# Patient Record
Sex: MALE | Race: WHITE | Employment: OTHER | URBAN - METROPOLITAN AREA
[De-identification: names, ages, dates, MRNs, and addresses within clinical notes are randomized per-mention and may not be internally consistent; named-entity substitution may affect disease eponyms.]

---

## 2023-03-07 ENCOUNTER — APPOINTMENT (OUTPATIENT)
Dept: GENERAL RADIOLOGY | Age: 68
DRG: 180 | End: 2023-03-07
Payer: MEDICARE

## 2023-03-07 ENCOUNTER — HOSPITAL ENCOUNTER (INPATIENT)
Age: 68
LOS: 2 days | Discharge: HOME | DRG: 180 | End: 2023-03-10
Attending: EMERGENCY MEDICINE | Admitting: INTERNAL MEDICINE
Payer: MEDICARE

## 2023-03-07 ENCOUNTER — APPOINTMENT (OUTPATIENT)
Dept: CT IMAGING | Age: 68
DRG: 180 | End: 2023-03-07
Payer: MEDICARE

## 2023-03-07 DIAGNOSIS — I71.43 INFRARENAL ABDOMINAL AORTIC ANEURYSM (AAA) WITHOUT RUPTURE: ICD-10-CM

## 2023-03-07 DIAGNOSIS — E87.8 ELECTROLYTE DISTURBANCE: ICD-10-CM

## 2023-03-07 DIAGNOSIS — E27.49 ADRENAL HEMORRHAGE (HCC): ICD-10-CM

## 2023-03-07 DIAGNOSIS — R91.8 MASS OF RIGHT LUNG: ICD-10-CM

## 2023-03-07 DIAGNOSIS — C80.0 WIDESPREAD METASTATIC MALIGNANT NEOPLASTIC DISEASE (HCC): Primary | ICD-10-CM

## 2023-03-07 LAB
A/G RATIO: 1.3 (ref 1.1–2.2)
ALBUMIN SERPL-MCNC: 4 G/DL (ref 3.4–5)
ALP BLD-CCNC: 117 U/L (ref 40–129)
ALT SERPL-CCNC: 12 U/L (ref 10–40)
ANION GAP SERPL CALCULATED.3IONS-SCNC: 14 MMOL/L (ref 3–16)
AST SERPL-CCNC: 24 U/L (ref 15–37)
BILIRUB SERPL-MCNC: 1.8 MG/DL (ref 0–1)
BUN BLDV-MCNC: 22 MG/DL (ref 7–20)
CALCIUM SERPL-MCNC: 9.4 MG/DL (ref 8.3–10.6)
CHLORIDE BLD-SCNC: 95 MMOL/L (ref 99–110)
CO2: 21 MMOL/L (ref 21–32)
CREAT SERPL-MCNC: 1 MG/DL (ref 0.8–1.3)
GFR SERPL CREATININE-BSD FRML MDRD: >60 ML/MIN/{1.73_M2}
GLUCOSE BLD-MCNC: 107 MG/DL (ref 70–99)
LACTIC ACID, SEPSIS: 1.7 MMOL/L (ref 0.4–1.9)
LIPASE: 10 U/L (ref 13–60)
POTASSIUM REFLEX MAGNESIUM: 4.3 MMOL/L (ref 3.5–5.1)
SODIUM BLD-SCNC: 130 MMOL/L (ref 136–145)
TOTAL PROTEIN: 7 G/DL (ref 6.4–8.2)

## 2023-03-07 PROCEDURE — 6360000004 HC RX CONTRAST MEDICATION: Performed by: EMERGENCY MEDICINE

## 2023-03-07 PROCEDURE — 96361 HYDRATE IV INFUSION ADD-ON: CPT

## 2023-03-07 PROCEDURE — 85025 COMPLETE CBC W/AUTO DIFF WBC: CPT

## 2023-03-07 PROCEDURE — 83605 ASSAY OF LACTIC ACID: CPT

## 2023-03-07 PROCEDURE — 71045 X-RAY EXAM CHEST 1 VIEW: CPT

## 2023-03-07 PROCEDURE — 80053 COMPREHEN METABOLIC PANEL: CPT

## 2023-03-07 PROCEDURE — 96375 TX/PRO/DX INJ NEW DRUG ADDON: CPT

## 2023-03-07 PROCEDURE — 99285 EMERGENCY DEPT VISIT HI MDM: CPT

## 2023-03-07 PROCEDURE — 6360000002 HC RX W HCPCS: Performed by: EMERGENCY MEDICINE

## 2023-03-07 PROCEDURE — 83930 ASSAY OF BLOOD OSMOLALITY: CPT

## 2023-03-07 PROCEDURE — 2580000003 HC RX 258: Performed by: EMERGENCY MEDICINE

## 2023-03-07 PROCEDURE — 74177 CT ABD & PELVIS W/CONTRAST: CPT

## 2023-03-07 PROCEDURE — 96374 THER/PROPH/DIAG INJ IV PUSH: CPT

## 2023-03-07 PROCEDURE — 83690 ASSAY OF LIPASE: CPT

## 2023-03-07 RX ORDER — MORPHINE SULFATE 4 MG/ML
4 INJECTION, SOLUTION INTRAMUSCULAR; INTRAVENOUS ONCE
Status: COMPLETED | OUTPATIENT
Start: 2023-03-07 | End: 2023-03-07

## 2023-03-07 RX ORDER — 0.9 % SODIUM CHLORIDE 0.9 %
1000 INTRAVENOUS SOLUTION INTRAVENOUS ONCE
Status: COMPLETED | OUTPATIENT
Start: 2023-03-07 | End: 2023-03-08

## 2023-03-07 RX ADMIN — IOPAMIDOL 75 ML: 755 INJECTION, SOLUTION INTRAVENOUS at 23:51

## 2023-03-07 RX ADMIN — MORPHINE SULFATE 4 MG: 4 INJECTION, SOLUTION INTRAMUSCULAR; INTRAVENOUS at 23:19

## 2023-03-07 RX ADMIN — SODIUM CHLORIDE 1000 ML: 9 INJECTION, SOLUTION INTRAVENOUS at 23:18

## 2023-03-07 ASSESSMENT — PAIN - FUNCTIONAL ASSESSMENT: PAIN_FUNCTIONAL_ASSESSMENT: 0-10

## 2023-03-07 ASSESSMENT — PAIN SCALES - GENERAL: PAINLEVEL_OUTOF10: 8

## 2023-03-07 ASSESSMENT — PAIN DESCRIPTION - LOCATION: LOCATION: ABDOMEN

## 2023-03-07 ASSESSMENT — PAIN DESCRIPTION - DESCRIPTORS: DESCRIPTORS: SHARP

## 2023-03-07 ASSESSMENT — PAIN DESCRIPTION - ORIENTATION: ORIENTATION: RIGHT;UPPER

## 2023-03-08 ENCOUNTER — APPOINTMENT (OUTPATIENT)
Dept: CT IMAGING | Age: 68
DRG: 180 | End: 2023-03-08
Payer: MEDICARE

## 2023-03-08 ENCOUNTER — ANESTHESIA EVENT (OUTPATIENT)
Dept: ENDOSCOPY | Age: 68
End: 2023-03-08
Payer: MEDICARE

## 2023-03-08 PROBLEM — R59.0 MEDIASTINAL ADENOPATHY: Status: ACTIVE | Noted: 2023-03-08

## 2023-03-08 PROBLEM — K66.1 NONTRAUMATIC RETROPERITONEAL HEMATOMA: Status: ACTIVE | Noted: 2023-03-08

## 2023-03-08 PROBLEM — C80.0 WIDESPREAD METASTATIC MALIGNANT NEOPLASTIC DISEASE (HCC): Status: ACTIVE | Noted: 2023-03-08

## 2023-03-08 PROBLEM — E27.8 RIGHT ADRENAL MASS (HCC): Status: ACTIVE | Noted: 2023-03-08

## 2023-03-08 PROBLEM — F17.200 CURRENT SMOKER: Status: ACTIVE | Noted: 2023-03-08

## 2023-03-08 PROBLEM — I71.43 INFRARENAL ABDOMINAL AORTIC ANEURYSM (AAA) WITHOUT RUPTURE (HCC): Status: ACTIVE | Noted: 2023-03-08

## 2023-03-08 PROBLEM — J43.2 CENTRILOBULAR EMPHYSEMA (HCC): Status: ACTIVE | Noted: 2023-03-08

## 2023-03-08 PROBLEM — R91.8 MASS OF MIDDLE LOBE OF RIGHT LUNG: Status: ACTIVE | Noted: 2023-03-08

## 2023-03-08 PROBLEM — E87.1 HYPONATREMIA: Status: ACTIVE | Noted: 2023-03-08

## 2023-03-08 PROBLEM — D72.829 LEUKOCYTOSIS: Status: ACTIVE | Noted: 2023-03-08

## 2023-03-08 PROBLEM — E27.49 ADRENAL HEMORRHAGE (HCC): Status: ACTIVE | Noted: 2023-03-08

## 2023-03-08 LAB
A/G RATIO: 1.4 (ref 1.1–2.2)
ABO/RH: NORMAL
ALBUMIN SERPL-MCNC: 3.6 G/DL (ref 3.4–5)
ALP BLD-CCNC: 96 U/L (ref 40–129)
ALT SERPL-CCNC: 11 U/L (ref 10–40)
ANION GAP SERPL CALCULATED.3IONS-SCNC: 11 MMOL/L (ref 3–16)
ANTIBODY SCREEN: NORMAL
APTT: 28.7 SEC (ref 23–34.3)
AST SERPL-CCNC: 21 U/L (ref 15–37)
ATYPICAL LYMPHOCYTE RELATIVE PERCENT: 2 % (ref 0–6)
BACTERIA: ABNORMAL /HPF
BANDED NEUTROPHILS RELATIVE PERCENT: 7 % (ref 0–7)
BASOPHILS ABSOLUTE: 0 K/UL (ref 0–0.2)
BASOPHILS ABSOLUTE: 0.2 K/UL (ref 0–0.2)
BASOPHILS RELATIVE PERCENT: 0 %
BASOPHILS RELATIVE PERCENT: 1.1 %
BILIRUB SERPL-MCNC: 1.5 MG/DL (ref 0–1)
BILIRUBIN URINE: NEGATIVE
BLOOD, URINE: ABNORMAL
BUN BLDV-MCNC: 24 MG/DL (ref 7–20)
CALCIUM SERPL-MCNC: 8.7 MG/DL (ref 8.3–10.6)
CHLORIDE BLD-SCNC: 94 MMOL/L (ref 99–110)
CLARITY: CLEAR
CO2: 23 MMOL/L (ref 21–32)
COLOR: YELLOW
CREAT SERPL-MCNC: 0.9 MG/DL (ref 0.8–1.3)
EKG ATRIAL RATE: 87 BPM
EKG ATRIAL RATE: 94 BPM
EKG DIAGNOSIS: NORMAL
EKG DIAGNOSIS: NORMAL
EKG P AXIS: 75 DEGREES
EKG P AXIS: 78 DEGREES
EKG P-R INTERVAL: 146 MS
EKG P-R INTERVAL: 150 MS
EKG Q-T INTERVAL: 340 MS
EKG Q-T INTERVAL: 378 MS
EKG QRS DURATION: 78 MS
EKG QRS DURATION: 80 MS
EKG QTC CALCULATION (BAZETT): 425 MS
EKG QTC CALCULATION (BAZETT): 454 MS
EKG R AXIS: 19 DEGREES
EKG R AXIS: 27 DEGREES
EKG T AXIS: 27 DEGREES
EKG T AXIS: 54 DEGREES
EKG VENTRICULAR RATE: 87 BPM
EKG VENTRICULAR RATE: 94 BPM
EOSINOPHILS ABSOLUTE: 0.1 K/UL (ref 0–0.6)
EOSINOPHILS ABSOLUTE: 0.2 K/UL (ref 0–0.6)
EOSINOPHILS RELATIVE PERCENT: 1 %
EOSINOPHILS RELATIVE PERCENT: 1 %
EPITHELIAL CELLS, UA: ABNORMAL /HPF (ref 0–5)
GFR SERPL CREATININE-BSD FRML MDRD: >60 ML/MIN/{1.73_M2}
GLUCOSE BLD-MCNC: 118 MG/DL (ref 70–99)
GLUCOSE URINE: NEGATIVE MG/DL
HCT VFR BLD CALC: 37 % (ref 40.5–52.5)
HCT VFR BLD CALC: 40.8 % (ref 40.5–52.5)
HEMOGLOBIN: 12.4 G/DL (ref 13.5–17.5)
HEMOGLOBIN: 13.6 G/DL (ref 13.5–17.5)
INR BLD: 1.24 (ref 0.87–1.14)
KETONES, URINE: 15 MG/DL
LEUKOCYTE ESTERASE, URINE: NEGATIVE
LYMPHOCYTES ABSOLUTE: 0.5 K/UL (ref 1–5.1)
LYMPHOCYTES ABSOLUTE: 0.8 K/UL (ref 1–5.1)
LYMPHOCYTES RELATIVE PERCENT: 1 %
LYMPHOCYTES RELATIVE PERCENT: 5.7 %
MCH RBC QN AUTO: 30.3 PG (ref 26–34)
MCH RBC QN AUTO: 30.6 PG (ref 26–34)
MCHC RBC AUTO-ENTMCNC: 33.4 G/DL (ref 31–36)
MCHC RBC AUTO-ENTMCNC: 33.6 G/DL (ref 31–36)
MCV RBC AUTO: 90.7 FL (ref 80–100)
MCV RBC AUTO: 91.2 FL (ref 80–100)
MICROSCOPIC EXAMINATION: YES
MONOCYTES ABSOLUTE: 0.8 K/UL (ref 0–1.3)
MONOCYTES ABSOLUTE: 1.3 K/UL (ref 0–1.3)
MONOCYTES RELATIVE PERCENT: 5 %
MONOCYTES RELATIVE PERCENT: 9.2 %
NEUTROPHILS ABSOLUTE: 11.7 K/UL (ref 1.7–7.7)
NEUTROPHILS ABSOLUTE: 14.5 K/UL (ref 1.7–7.7)
NEUTROPHILS RELATIVE PERCENT: 83 %
NEUTROPHILS RELATIVE PERCENT: 84 %
NITRITE, URINE: NEGATIVE
OSMOLALITY URINE: 734 MOSM/KG (ref 390–1070)
OSMOLALITY: 285 MOSM/KG (ref 280–301)
PDW BLD-RTO: 13.3 % (ref 12.4–15.4)
PDW BLD-RTO: 13.6 % (ref 12.4–15.4)
PH UA: 5.5 (ref 5–8)
PLATELET # BLD: 192 K/UL (ref 135–450)
PLATELET # BLD: 222 K/UL (ref 135–450)
PMV BLD AUTO: 7.9 FL (ref 5–10.5)
PMV BLD AUTO: 8 FL (ref 5–10.5)
POTASSIUM REFLEX MAGNESIUM: 4.2 MMOL/L (ref 3.5–5.1)
PROCALCITONIN: 0.18 NG/ML (ref 0–0.15)
PROTEIN UA: NEGATIVE MG/DL
PROTHROMBIN TIME: 15.6 SEC (ref 11.7–14.5)
RBC # BLD: 4.05 M/UL (ref 4.2–5.9)
RBC # BLD: 4.5 M/UL (ref 4.2–5.9)
RBC # BLD: NORMAL 10*6/UL
RBC UA: ABNORMAL /HPF (ref 0–4)
SLIDE REVIEW: ABNORMAL
SODIUM BLD-SCNC: 128 MMOL/L (ref 136–145)
SODIUM URINE: 40 MMOL/L
SPECIFIC GRAVITY UA: 1.01 (ref 1–1.03)
TOTAL PROTEIN: 6.2 G/DL (ref 6.4–8.2)
URINE REFLEX TO CULTURE: ABNORMAL
URINE TYPE: ABNORMAL
UROBILINOGEN, URINE: 1 E.U./DL
WBC # BLD: 14.2 K/UL (ref 4–11)
WBC # BLD: 15.9 K/UL (ref 4–11)
WBC UA: ABNORMAL /HPF (ref 0–5)

## 2023-03-08 PROCEDURE — 93005 ELECTROCARDIOGRAM TRACING: CPT | Performed by: EMERGENCY MEDICINE

## 2023-03-08 PROCEDURE — 85610 PROTHROMBIN TIME: CPT

## 2023-03-08 PROCEDURE — 6370000000 HC RX 637 (ALT 250 FOR IP): Performed by: INTERNAL MEDICINE

## 2023-03-08 PROCEDURE — 36415 COLL VENOUS BLD VENIPUNCTURE: CPT

## 2023-03-08 PROCEDURE — 2580000003 HC RX 258: Performed by: NURSE PRACTITIONER

## 2023-03-08 PROCEDURE — 99223 1ST HOSP IP/OBS HIGH 75: CPT | Performed by: STUDENT IN AN ORGANIZED HEALTH CARE EDUCATION/TRAINING PROGRAM

## 2023-03-08 PROCEDURE — 93010 ELECTROCARDIOGRAM REPORT: CPT | Performed by: INTERNAL MEDICINE

## 2023-03-08 PROCEDURE — 99222 1ST HOSP IP/OBS MODERATE 55: CPT | Performed by: SURGERY

## 2023-03-08 PROCEDURE — 81001 URINALYSIS AUTO W/SCOPE: CPT

## 2023-03-08 PROCEDURE — 2500000003 HC RX 250 WO HCPCS: Performed by: NURSE PRACTITIONER

## 2023-03-08 PROCEDURE — 85025 COMPLETE CBC W/AUTO DIFF WBC: CPT

## 2023-03-08 PROCEDURE — 6360000002 HC RX W HCPCS: Performed by: NURSE PRACTITIONER

## 2023-03-08 PROCEDURE — 86900 BLOOD TYPING SEROLOGIC ABO: CPT

## 2023-03-08 PROCEDURE — 71250 CT THORAX DX C-: CPT

## 2023-03-08 PROCEDURE — 80053 COMPREHEN METABOLIC PANEL: CPT

## 2023-03-08 PROCEDURE — 86901 BLOOD TYPING SEROLOGIC RH(D): CPT

## 2023-03-08 PROCEDURE — 1200000000 HC SEMI PRIVATE

## 2023-03-08 PROCEDURE — 86850 RBC ANTIBODY SCREEN: CPT

## 2023-03-08 PROCEDURE — 84145 PROCALCITONIN (PCT): CPT

## 2023-03-08 PROCEDURE — 85730 THROMBOPLASTIN TIME PARTIAL: CPT

## 2023-03-08 PROCEDURE — 93005 ELECTROCARDIOGRAM TRACING: CPT | Performed by: NURSE PRACTITIONER

## 2023-03-08 PROCEDURE — 84300 ASSAY OF URINE SODIUM: CPT

## 2023-03-08 PROCEDURE — 6370000000 HC RX 637 (ALT 250 FOR IP): Performed by: NURSE PRACTITIONER

## 2023-03-08 PROCEDURE — 83935 ASSAY OF URINE OSMOLALITY: CPT

## 2023-03-08 PROCEDURE — 6360000002 HC RX W HCPCS: Performed by: EMERGENCY MEDICINE

## 2023-03-08 RX ORDER — MECOBALAMIN 5000 MCG
5 TABLET,DISINTEGRATING ORAL NIGHTLY
Status: DISCONTINUED | OUTPATIENT
Start: 2023-03-08 | End: 2023-03-10 | Stop reason: HOSPADM

## 2023-03-08 RX ORDER — SODIUM CHLORIDE 9 MG/ML
INJECTION, SOLUTION INTRAVENOUS CONTINUOUS
Status: DISCONTINUED | OUTPATIENT
Start: 2023-03-08 | End: 2023-03-08

## 2023-03-08 RX ORDER — PANTOPRAZOLE SODIUM 40 MG/1
40 TABLET, DELAYED RELEASE ORAL
Status: DISCONTINUED | OUTPATIENT
Start: 2023-03-08 | End: 2023-03-10 | Stop reason: HOSPADM

## 2023-03-08 RX ORDER — ACETAMINOPHEN 650 MG/1
650 SUPPOSITORY RECTAL EVERY 6 HOURS PRN
Status: DISCONTINUED | OUTPATIENT
Start: 2023-03-08 | End: 2023-03-10 | Stop reason: HOSPADM

## 2023-03-08 RX ORDER — ACETAMINOPHEN 325 MG/1
650 TABLET ORAL EVERY 6 HOURS PRN
Status: DISCONTINUED | OUTPATIENT
Start: 2023-03-08 | End: 2023-03-10 | Stop reason: HOSPADM

## 2023-03-08 RX ORDER — SODIUM CHLORIDE 9 MG/ML
INJECTION, SOLUTION INTRAVENOUS PRN
Status: DISCONTINUED | OUTPATIENT
Start: 2023-03-08 | End: 2023-03-10 | Stop reason: HOSPADM

## 2023-03-08 RX ORDER — LABETALOL HYDROCHLORIDE 5 MG/ML
10 INJECTION, SOLUTION INTRAVENOUS EVERY 4 HOURS PRN
Status: DISCONTINUED | OUTPATIENT
Start: 2023-03-08 | End: 2023-03-10 | Stop reason: HOSPADM

## 2023-03-08 RX ORDER — SODIUM CHLORIDE 0.9 % (FLUSH) 0.9 %
5-40 SYRINGE (ML) INJECTION PRN
Status: DISCONTINUED | OUTPATIENT
Start: 2023-03-08 | End: 2023-03-10 | Stop reason: HOSPADM

## 2023-03-08 RX ORDER — MORPHINE SULFATE 2 MG/ML
2 INJECTION, SOLUTION INTRAMUSCULAR; INTRAVENOUS EVERY 4 HOURS PRN
Status: DISCONTINUED | OUTPATIENT
Start: 2023-03-08 | End: 2023-03-08

## 2023-03-08 RX ORDER — POLYETHYLENE GLYCOL 3350 17 G/17G
17 POWDER, FOR SOLUTION ORAL DAILY PRN
Status: DISCONTINUED | OUTPATIENT
Start: 2023-03-08 | End: 2023-03-10 | Stop reason: HOSPADM

## 2023-03-08 RX ORDER — ONDANSETRON 2 MG/ML
4 INJECTION INTRAMUSCULAR; INTRAVENOUS EVERY 6 HOURS PRN
Status: DISCONTINUED | OUTPATIENT
Start: 2023-03-08 | End: 2023-03-10 | Stop reason: HOSPADM

## 2023-03-08 RX ORDER — ONDANSETRON 4 MG/1
4 TABLET, ORALLY DISINTEGRATING ORAL EVERY 8 HOURS PRN
Status: DISCONTINUED | OUTPATIENT
Start: 2023-03-08 | End: 2023-03-10 | Stop reason: HOSPADM

## 2023-03-08 RX ORDER — SODIUM CHLORIDE 0.9 % (FLUSH) 0.9 %
5-40 SYRINGE (ML) INJECTION EVERY 12 HOURS SCHEDULED
Status: DISCONTINUED | OUTPATIENT
Start: 2023-03-08 | End: 2023-03-10 | Stop reason: HOSPADM

## 2023-03-08 RX ADMIN — SODIUM CHLORIDE, PRESERVATIVE FREE 10 ML: 5 INJECTION INTRAVENOUS at 21:32

## 2023-03-08 RX ADMIN — HYDROMORPHONE HYDROCHLORIDE 0.25 MG: 0.5 INJECTION, SOLUTION INTRAMUSCULAR; INTRAVENOUS; SUBCUTANEOUS at 23:32

## 2023-03-08 RX ADMIN — Medication 5 MG: at 20:01

## 2023-03-08 RX ADMIN — PANTOPRAZOLE SODIUM 40 MG: 40 TABLET, DELAYED RELEASE ORAL at 06:08

## 2023-03-08 RX ADMIN — SODIUM CHLORIDE: 9 INJECTION, SOLUTION INTRAVENOUS at 03:57

## 2023-03-08 RX ADMIN — HYDROMORPHONE HYDROCHLORIDE 0.5 MG: 1 INJECTION, SOLUTION INTRAMUSCULAR; INTRAVENOUS; SUBCUTANEOUS at 00:51

## 2023-03-08 RX ADMIN — SODIUM CHLORIDE, PRESERVATIVE FREE 10 ML: 5 INJECTION INTRAVENOUS at 07:52

## 2023-03-08 RX ADMIN — HYDROMORPHONE HYDROCHLORIDE 0.5 MG: 0.5 INJECTION, SOLUTION INTRAMUSCULAR; INTRAVENOUS; SUBCUTANEOUS at 02:41

## 2023-03-08 RX ADMIN — LABETALOL HYDROCHLORIDE 10 MG: 5 INJECTION INTRAVENOUS at 02:41

## 2023-03-08 ASSESSMENT — ENCOUNTER SYMPTOMS
VOMITING: 0
NAUSEA: 0
WHEEZING: 0
CONSTIPATION: 0
EYE PAIN: 0
STRIDOR: 0
SHORTNESS OF BREATH: 1
ABDOMINAL DISTENTION: 0
COUGH: 1
TROUBLE SWALLOWING: 0
EYE REDNESS: 0
SORE THROAT: 0
ABDOMINAL PAIN: 0
COLOR CHANGE: 0
EYE ITCHING: 0
EYE DISCHARGE: 0
BACK PAIN: 0
DIARRHEA: 0

## 2023-03-08 ASSESSMENT — PAIN SCALES - GENERAL
PAINLEVEL_OUTOF10: 0
PAINLEVEL_OUTOF10: 2
PAINLEVEL_OUTOF10: 0
PAINLEVEL_OUTOF10: 5
PAINLEVEL_OUTOF10: 0
PAINLEVEL_OUTOF10: 0
PAINLEVEL_OUTOF10: 7

## 2023-03-08 ASSESSMENT — PAIN DESCRIPTION - DESCRIPTORS: DESCRIPTORS: ACHING

## 2023-03-08 ASSESSMENT — PAIN DESCRIPTION - LOCATION: LOCATION: BACK

## 2023-03-08 NOTE — CONSULTS
80- Called A vascular surgery  Per   RE abdominal aortic anuerysm with possible hematoma  0058-  returned page

## 2023-03-08 NOTE — PROGRESS NOTES
Vascular    Asymptomatic intact AAA. ~5cm- no indication for urgent repair, generally repair when >5.5cm. 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 Full consult to follow.

## 2023-03-08 NOTE — CONSULTS
Pulmonary New Consultation       Patient Name:  Osman Mendiola ADMISSION/CC: Lung mass    PCP: No primary care provider on file. HISTORY OF PRESENT ILLNESS:   69y.o. year old male with significant past medical history of tobacco dependence that presents with right-sided chest pain. Patient said 3 days prior to presentation, he had acute shortness of breath and right-sided chest pain. He denies any fever, chills, nausea, vomiting, abdominal pain, diarrhea. He has shortness of breath with exertion, but it was acutely worse with his pain. He denies any significant weight loss, night sweats. Patient is an every day smoker. He smoked up to 1 to 2 packs/day for the past 50 years. He denies any illicit drug use, no vaping/pipes. He has had multiple jobs in the past, denies any occupational exposures. He denies any household exposures. Pulmonary was consulted for abnormal CT imaging. Past Medical History:   Diagnosis Date    Hypertension        History reviewed. No pertinent surgical history. family history is not on file.     Social History     Tobacco Use    Smoking status: Former     Types: Cigarettes    Smokeless tobacco: Never   Substance Use Topics    Alcohol use: Never        Meds:    Current Facility-Administered Medications:     sodium chloride flush 0.9 % injection 5-40 mL, 5-40 mL, IntraVENous, 2 times per day, VIKA Moreno - CNP, 10 mL at 03/08/23 0752    sodium chloride flush 0.9 % injection 5-40 mL, 5-40 mL, IntraVENous, PRN, Telly Velez, APRN - CNP    0.9 % sodium chloride infusion, , IntraVENous, PRN, Telly Velez APRN - CNP    ondansetron (ZOFRAN-ODT) disintegrating tablet 4 mg, 4 mg, Oral, Q8H PRN **OR** ondansetron (ZOFRAN) injection 4 mg, 4 mg, IntraVENous, Q6H PRN, Telly Velez APRN - CNP    polyethylene glycol (GLYCOLAX) packet 17 g, 17 g, Oral, Daily PRN, Telly Velez APRN - CNP    acetaminophen (TYLENOL) tablet 650 mg, 650 mg, Oral, Q6H PRN **OR** acetaminophen (TYLENOL) suppository 650 mg, 650 mg, Rectal, Q6H PRN, VIKA Galdamez CNP    0.9 % sodium chloride infusion, , IntraVENous, Continuous, VIKA Galdamez CNP, Last Rate: 75 mL/hr at 03/08/23 0357, New Bag at 03/08/23 0357    pantoprazole (PROTONIX) tablet 40 mg, 40 mg, Oral, QAM AC, VIKA Galdamez CNP, 40 mg at 03/08/23 3845    HYDROmorphone (DILAUDID) injection 0.25 mg, 0.25 mg, IntraVENous, Q3H PRN **OR** HYDROmorphone (DILAUDID) injection 0.5 mg, 0.5 mg, IntraVENous, Q3H PRN, VIKA Galdamez CNP, 0.5 mg at 03/08/23 0241    labetalol (NORMODYNE;TRANDATE) injection 10 mg, 10 mg, IntraVENous, Q4H PRN, VIKA Galdamez CNP, 10 mg at 03/08/23 0241     Continuous Infusions:   sodium chloride      sodium chloride 75 mL/hr at 03/08/23 0357       PRN Meds:  sodium chloride flush, sodium chloride, ondansetron **OR** ondansetron, polyethylene glycol, acetaminophen **OR** acetaminophen, HYDROmorphone **OR** HYDROmorphone, labetalol    Allergies:  Patient is allergic to codeine. REVIEW OF SYSTEMS:  Review of Systems   Constitutional:  Negative for activity change, appetite change, chills, diaphoresis and fatigue. HENT:  Negative for congestion, sore throat and trouble swallowing. Eyes:  Negative for pain, discharge, redness and itching. Respiratory:  Positive for cough and shortness of breath. Negative for wheezing and stridor. Cardiovascular:  Positive for chest pain. Negative for palpitations and leg swelling. Gastrointestinal:  Negative for abdominal distention, abdominal pain, constipation, diarrhea, nausea and vomiting. Endocrine: Negative for polydipsia, polyphagia and polyuria. Genitourinary:  Negative for difficulty urinating. Musculoskeletal:  Negative for back pain, myalgias and neck pain. Skin:  Negative for color change. Neurological:  Negative for dizziness, weakness and light-headedness.    Psychiatric/Behavioral:  Negative for agitation and behavioral problems. I personally reviewed the patient's medication list, medical and surgical history, and updated as needed. Objective:   EXAM:  /86   Pulse 90   Temp 98.1 °F (36.7 °C) (Oral)   Resp 20   Ht 5' 9\" (1.753 m)   Wt 187 lb 6.4 oz (85 kg)   SpO2 96%   BMI 27.67 kg/m²      Physical Exam  Constitutional:       General: He is not in acute distress. Appearance: He is obese. He is not toxic-appearing. HENT:      Head: Normocephalic and atraumatic. Nose: Nose normal.      Mouth/Throat:      Pharynx: No oropharyngeal exudate. Eyes:      General: No scleral icterus. Right eye: No discharge. Left eye: No discharge. Cardiovascular:      Rate and Rhythm: Normal rate and regular rhythm. Heart sounds: No murmur heard. No friction rub. No gallop. Pulmonary:      Effort: Pulmonary effort is normal. No respiratory distress. Breath sounds: No wheezing or rales. Abdominal:      General: Abdomen is flat. Bowel sounds are normal.      Palpations: Abdomen is soft. Musculoskeletal:         General: No swelling. Normal range of motion. Cervical back: Normal range of motion. Skin:     General: Skin is warm and dry. Neurological:      General: No focal deficit present. Mental Status: He is alert and oriented to person, place, and time.    Psychiatric:         Mood and Affect: Mood normal.          Data Reviewed:   LABS:  :   Recent Labs     03/07/23 2316 03/08/23 0450   WBC 15.9* 14.2*   HGB 13.6 12.4*   HCT 40.8 37.0*   MCV 90.7 91.2    192     BMP:   Recent Labs     03/07/23 2316 03/08/23  0450   * 128*   K 4.3 4.2   CL 95* 94*   CO2 21 23   BUN 22* 24*   CREATININE 1.0 0.9     PROFILE:   Recent Labs     03/07/23 2316 03/08/23  0450   AST 24 21   ALT 12 11   LIPASE 10.0*  --    BILITOT 1.8* 1.5*   ALKPHOS 117 96     PT/INR:   Recent Labs     03/08/23  0055   PROTIME 15.6*   INR 1.24*     APTT:  Recent Labs 03/08/23  0055   APTT 28.7     :  Recent Labs     03/08/23  0129   COLORU Yellow   PHUR 5.5   WBCUA 3-5   RBCUA 3-4   BACTERIA Rare*   CLARITYU Clear   SPECGRAV 1.010   LEUKOCYTESUR Negative   UROBILINOGEN 1.0   BILIRUBINUR Negative   BLOODU MODERATE*   GLUCOSEU Negative     No results for input(s): PHART, [id number], PO2ART in the last 72 hours. Chest x-ray 3/7/2023  Interstitial changes at the bases bilaterally. Costophrenic angles are sharp. Increase in vascular congestion. Left upper lobe opacity. CT chest without contrast 3/8/2023  Paratracheal adenopathy, mediastinal adenopathy. No pericardial or pleural effusion. Paraseptal and centrilobular emphysema. Left upper lobe nodule suspicious for cancer. Right lower lobe mass with posterior segment airway going into it. Atelectasis at the bases. Assessment/Plan   69y.o. year old male with significant past medical history of tobacco dependence that presents with right-sided chest pain.     Assessment:  Right lower lobe lung mass  Left upper lobe nodule  Tobacco dependence  Centrilobular and paraseptal emphysema  Paratracheal and mediastinal adenopathy  Atelectasis    Plan:  - NPO after midnight for EBUS and bronchoscopy with possible radial probe biopsy for tomorrow at 11AM. This is depending on Vascular clearance from AAA finding.   - Discussed risks/benefits of procedure with patient, consent will be obtained tomorrow  - Hold DVT ppx  - Encouraged continue smoking cessation    China Harvey MD    11:28 AM

## 2023-03-08 NOTE — PLAN OF CARE
Problem: Discharge Planning  Goal: Discharge to home or other facility with appropriate resources  Outcome: Progressing     Problem: Pain  Goal: Verbalizes/displays adequate comfort level or baseline comfort level  Outcome: Progressing     Problem: Safety - Adult  Goal: Free from fall injury  Outcome: Progressing     Problem: Risk for Elopement  Goal: Patient will not exit the unit/facility without proper excort  Outcome: Progressing  Flowsheets (Taken 3/8/2023 0300)  Nursing Interventions for Elopement Risk:   Collaborate with family members/caregivers to mitigate the elopement risk   Communicate/escalate to charge nurse the risk of elopement   Make sure patient has all necessary personal care items independent

## 2023-03-08 NOTE — CONSULTS
INPATIENT PULMONARY CRITICAL CARE CONSULT NOTE      Chief Complaint/Referring Provider:  Patient is being seen at the request of Dr. Lizz Wright for a consultation for lung mass with metastatic disease suspected     Presenting HPI: Patient came to the hospital because of abdominal pain    AS per admitting provider-Anuj Julien is a 77-year-old male with no known significant past medical history aside from noting he was told he has had high blood pressure in the past without any treatment who presents to [facility] emergency department with complaint of right upper quadrant pain since this past Monday. He also notes he has been having shortness of breath and coughing for quite some time but most noticeable within the last month as well. The pain is isolated to the right upper quadrant, does not radiate, describes as a sharp stabbing pain, average 8 out of 10 without any alleviating or propagating factors. His evaluation in the emergency department included laboratory studies, CT of the abdomen pelvis with IV contrast, and CT chest without contrast.  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 Given the hematoma, vascular surgery was consulted by the ED for possible bleed, vascular surgery did not feel the hematoma was secondary to AAA; however, they do would like to see the patient in morning. Additionally, CT of the chest showed multiple pulmonary nodules with a dominant mass in the right lower lobe consistent with metastatic disease with notable left scapular erosion. Pertinent laboratory studies show sodium 130, BUN 22, mildly elevated bilirubin at 1.8, and a WBC of 15.9. Patient received multiple rounds of IV pain medication in the emergency department.   Hospital team was consulted to admit this patient for infrarenal AAA and right adrenal hemorrhage with corresponding hematoma    Patient when seen earlier states that he came to the hospital because of excruciating right-sided abdominal pain which was significant with radiation to the back, patient states that he had 1 episode of vomiting which was like acid but no food particles, patient did not have any constipation or diarrhea, no fever or chills, patient has been having some cough with mucoid expectoration without any hemoptysis, patient does have some shortness of breath especially with exertion, patient does not have any significant pleuritic chest pain, patient has had minimal wheezing at times, patient on questioning further states that he did not have any significant odynophagia or dysphagia, patient did not have any significant dysuria or hematuria, no increasing leg edema  Patient states that he is a smoker till 3 days back, patient does not have any loss of appetite or loss of weight, patient does snore at nighttime, patient had no change in the ambient abdomen, 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, patient does not have any significant headaches or blurring of vision, did not have any significant dysuria or hematuria, patient does not have any increasing leg edema, patient did not have any other pertinent review of system of concern  Patient's abdominal pain seems to have subsided now     Patient Active Problem List    Diagnosis Date Noted    Infrarenal abdominal aortic aneurysm (AAA) without rupture 03/08/2023    Mass of middle lobe of right lung 03/08/2023    Right adrenal mass ([address] 03/08/2023    Nontraumatic retroperitoneal hematoma 03/08/2023    Hyponatremia 03/08/2023    Mediastinal adenopathy 03/08/2023    Current smoker 03/08/2023    Leukocytosis 03/08/2023    Centrilobular emphysema ([address] 03/08/2023       Past Medical History:   Diagnosis Date    Hypertension History reviewed. No pertinent surgical history. History reviewed. No pertinent family history. Social History     Tobacco Use    Smoking status: Former     Types: Cigarettes    Smokeless tobacco: Never   Substance Use Topics    Alcohol use: Never        Allergies   Allergen Reactions    Codeine Rash               Physical Exam:  Blood pressure (!) 145/82, pulse 93, temperature 98.1 °F (36.7 °C), temperature source Oral, resp. rate 20, height 5' 9\" (1.753 m), weight 187 lb 6.4 oz (85 kg), SpO2 95 %.'   Constitutional:  No acute distress. HENT:  Oropharynx is clear and moist. No thyromegaly. Eyes:  Conjunctivae are normal. Pupils equal, round, and reactive to light. No scleral icterus. Neck: . No tracheal deviation present. No obvious thyroid mass. Cardiovascular: Normal rate, regular rhythm, normal heart sounds. No right ventricular heave. No lower extremity edema. Pulmonary/Chest: No wheezes. No rales. Chest wall is not dull to percussion. No accessory muscle usage or stridor. Decreased breath sound intensity  Abdominal: Soft. Bowel sounds present. No distension or hernia. No tenderness. Musculoskeletal: No cyanosis. No clubbing. No obvious joint deformity. Lymphadenopathy: No cervical or supraclavicular adenopathy. Skin: Skin is warm and dry. No rash or nodules on the exposed extremities. Psychiatric: Normal mood and affect. Behavior is normal.  No anxiety. Neurologic: Alert, awake and oriented. PERRL.   Speech fluent        Results:  CBC:   Recent Labs     03/07/23 2316 03/08/23  0450   WBC 15.9* 14.2*   HGB 13.6 12.4*   HCT 40.8 37.0*   MCV 90.7 91.2    192     BMP:   Recent Labs     03/07/23 2316 03/08/23  0450   * 128*   K 4.3 4.2   CL 95* 94*   CO2 21 23   BUN 22* 24*   CREATININE 1.0 0.9     LIVER PROFILE:   Recent Labs     03/07/23 2316 03/08/23  0450   AST 24 21   ALT 12 11   LIPASE 10.0*  --    BILITOT 1.8* 1.5*   ALKPHOS 117 96     PT/INR:   Recent Labs     03/08/23  0055   PROTIME 15.6*   INR 1.24*     APTT:   Recent Labs     03/08/23  0055   APTT 28.7     UA:  Recent Labs     03/08/23  0129   COLORU Yellow   PHUR 5.5   WBCUA 3-5   RBCUA 3-4   BACTERIA Rare*   CLARITYU Clear   SPECGRAV 1.010   LEUKOCYTESUR Negative   UROBILINOGEN 1.0   BILIRUBINUR Negative   BLOODU MODERATE*   GLUCOSEU Negative       Imaging:  I have reviewed radiology images personally. CT Chest W/O Contrast   Final Result   1. Few bilateral pulmonary nodules including a dominant mass in the right   lower lobe compatible with malignancy with metastatic disease. 2. Mediastinal and right hilar lymphadenopathy. 3. Erosions of the left scapula compatible with osseous metastatic disease. Osteomyelitis could appear similar, but felt less likely given the additional   findings. 4. See same day CT abdomen regarding the upper abdominal findings. 5. Normal caliber thoracic aorta. 6. Mild emphysema. CT ABDOMEN PELVIS W IV CONTRAST Additional Contrast? None   Final Result   1. Hyperdense masses in the expected location of the right adrenal gland with   adjacent retroperitoneal hematoma. Findings are most compatible with   hemorrhagic adrenal metastatic disease. 2. Multiple foci of hepatic metastatic disease. 3. Large infrarenal abdominal aortic aneurysm measuring up to 5.1 cm. There   is low attenuation along the wall of the aorta surrounding the lumen, most   compatible with intramural hematoma or thrombus. This is of undetermined   stability without a comparison exam.  Recommend vascular consultation. Findings discussed directly with Shanthi Corcoran at approximately 12:15 a.m.   on 03/08/2023. XR CHEST PORTABLE   Preliminary Result   Mild nonspecific interstitial prominence in the right lung base could be   atelectasis or mild pneumonia. Indeterminate pulmonary nodule in the left upper lobe.       Recommend correlation with CT chest.           CT Chest W/O Contrast    Result Date: 3/8/2023  EXAMINATION: CT OF THE CHEST WITHOUT CONTRAST 3/8/2023 12:28 am TECHNIQUE: CT of the chest was performed without the administration of intravenous contrast. Multiplanar reformatted images are provided for review. Automated exposure control, iterative reconstruction, and/or weight based adjustment of the mA/kV was utilized to reduce the radiation dose to as low as reasonably achievable. COMPARISON: Same-day CT abdomen and chest x-ray HISTORY: ORDERING SYSTEM PROVIDED HISTORY: abnormal CXR TECHNOLOGIST PROVIDED HISTORY: Reason for exam:->abnormal CXR Decision Support Exception - unselect if not a suspected or confirmed emergency medical condition->Emergency Medical Condition (MA) Reason for Exam: abnormal chest xray FINDINGS: Mediastinum: The heart is not enlarged. No pericardial effusion. Normal caliber thoracic aorta measuring up to 3.5 cm at the ascending thoracic aorta. Right hilar lymphadenopathy measuring up to 1.7 cm in short axis. Mildly enlarged pretracheal lymph node measuring 1.3 cm in short axis. Subcarinal lymph node measures up to 1.3 cm. Lungs/pleura: Multiple highly concerning pulmonary nodules including a dominant spiculated mass in the right lower lobe measuring 3.0 x 2.7 by 2.5 cm. This abuts and may partially cross the major fissure. Pulmonary nodule seen on chest x-ray in the left upper lobe measures 1.3 cm and is also concerning for metastatic disease. Bronchiectasis with mild atelectasis in the right lower lobe. Right perihilar pulmonary nodule measures 2.7 x 2.6 cm. No pleural effusion or pneumothorax. Mild emphysema. Upper Abdomen: See same day CT abdomen. Right retroperitoneal hematoma with hemorrhagic masses favoring metastatic disease at the level of the right adrenal glands. Hepatic metastatic disease is better seen on same-day CT abdomen. Soft Tissues/Bones: No acute findings within the subcutaneous soft tissues.  Spondylosis of the visualized spine.  Osseous erosions of the left lateral scapula compatible with osseous metastatic disease. Other infectious process felt less likely given the additional findings. 1. Few bilateral pulmonary nodules including a dominant mass in the right lower lobe compatible with malignancy with metastatic disease. 2. Mediastinal and right hilar lymphadenopathy. 3. Erosions of the left scapula compatible with osseous metastatic disease. Osteomyelitis could appear similar, but felt less likely given the additional findings. 4. See same day CT abdomen regarding the upper abdominal findings. 5. Normal caliber thoracic aorta. 6. Mild emphysema. CT ABDOMEN PELVIS W IV CONTRAST Additional Contrast? None    Result Date: 3/8/2023  EXAMINATION: CT OF THE ABDOMEN AND PELVIS WITH CONTRAST 3/7/2023 11:45 pm TECHNIQUE: CT of the abdomen and pelvis was performed with the administration of intravenous contrast. Multiplanar reformatted images are provided for review. Automated exposure control, iterative reconstruction, and/or weight based adjustment of the mA/kV was utilized to reduce the radiation dose to as low as reasonably achievable. COMPARISON: None. HISTORY: ORDERING SYSTEM PROVIDED HISTORY: RUQ pain, positive moctezuma's sign TECHNOLOGIST PROVIDED HISTORY: Additional Contrast?->None Reason for exam:->RUQ pain, positive moctezuma's sign Decision Support Exception - unselect if not a suspected or confirmed emergency medical condition->Emergency Medical Condition (MA) Reason for Exam: right upper abdominal pain Relevant Medical/Surgical History: positive moctezuma's sign FINDINGS: Lower Chest: Bronchiectasis in the right lung base with mild right basilar infiltrate or atelectasis. Background of mild emphysema. Organs: Liver: Multiple liver masses concerning for metastatic disease. The largest measures 4.8 x 4.7 cm and 50 Hounsfield units. Gallbladder: Unremarkable gallbladder. No biliary ductal dilatation Spleen: Unremarkable spleen. Pancreas: No peripancreatic inflammatory changes. Adrenal Glands: Ill-defined right adrenal gland. Hyperdense masses in the expected location of the right adrenal gland measuring 60 Hounsfield units and up to 5.4 x 3.4 by 6.1 cm and 4.1 x 3.3 cm. Favor hemorrhagic masses such as metastatic disease. Spontaneous adrenal hemorrhage felt less likely. Unremarkable left adrenal gland. Kidneys: No hydronephrosis. Multiple small renal cysts bilaterally. Retroperitoneal hematoma likely extending from the right adrenal masses adjacent to the right kidney. Nonobstructing bilateral nephrolithiasis measuring up to 5 mm on the left. GI/Bowel: Stomach: The stomach is nondistended. Small bowel: Inflammatory changes near the distal stomach and proximal duodenum, likely related to the above process. Gastric bleeding is felt less likely. No evidence of small-bowel obstruction. Colon: No significant pericolonic inflammatory changes. Appendix: Normal appearance of the appendix. Pelvis: No free fluid in the pelvis. Prostate calcifications. Incomplete distension of the urinary bladder. Peritoneum/Retroperitoneum: Large infrarenal abdominal aortic aneurysm measuring up to 5.1 by 4.9 cm. There is an area of low attenuation, likely intramural hematoma or thrombus surrounding the the aorta. The inferior mesenteric artery projects through this area of low attenuation. Findings are of undetermined stability without a comparison study. Bones/Soft Tissues: No acute findings within the subcutaneous soft tissues. Spondylosis of the visualized spine. 1. Hyperdense masses in the expected location of the right adrenal gland with adjacent retroperitoneal hematoma. 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 2. Multiple foci of hepatic metastatic disease. 3. Large infrarenal abdominal aortic aneurysm measuring up to 5.1 cm.   There is low attenuation along the wall of the aorta surrounding the lumen, most compatible with intramural hematoma or thrombus. This is of undetermined stability without a comparison exam.  Recommend vascular consultation. Findings discussed directly with Duwaine Apley at approximately 12:15 a.m. on 03/08/2023. XR CHEST PORTABLE    Result Date: 3/8/2023  EXAMINATION: ONE XRAY VIEW OF THE CHEST 3/7/2023 11:47 pm COMPARISON: None. HISTORY: ORDERING SYSTEM PROVIDED HISTORY: tachycardia TECHNOLOGIST PROVIDED HISTORY: Reason for exam:->tachycardia Reason for Exam: Abdominal Pain (RUQ pain. Started Monday. Last BM this weekend. Emesis x1) FINDINGS: The cardiomediastinal silhouette is not enlarged. No pleural effusion or pneumothorax. Mild interstitial prominence most notable in the right lower lobe. Circumscribed nodule in the left upper lobe is not definitively calcified and is indeterminate without a comparison study. Mild nonspecific interstitial prominence in the right lung base could be atelectasis or mild pneumonia. Indeterminate pulmonary nodule in the left upper lobe. Recommend correlation with CT chest.           Echocardiogram:None in Epic   PFT:None in Epic         Assessment:  Principal Problem:    Infrarenal abdominal aortic aneurysm (AAA) without rupture  Active Problems:    Mass of middle lobe of right lung    Right adrenal mass ([facility])    Nontraumatic retroperitoneal hematoma    Hyponatremia    Mediastinal adenopathy    Current smoker    Leukocytosis    Centrilobular emphysema ([facility])  Resolved Problems:    * No resolved hospital problems.  *          Plan:   Oxygen supplementation, if required, to keep saturation between 90 to 94% only  Patient was on room air oxygen when seen  Patient was shown the CT of the chest along with his findings, differential diagnosis along with implications options  On the basis of patient's clinical history as well as data available it appears that patient has a metastatic disease probably coming from lungs  Patient does have hyponatremia which may be because of paraneoplastic syndrome  Patient also has mediastinal adenopathy at this time and there is possible skeletal metastasis  Patient also has a AAA  Patient was told about the disease process and different diagnoses including patient having metastatic disease and patient was told that we can subject the patient to have CT-guided lung biopsy for diagnosis   Patient understands his clinical status and patient states that he it is too much for him to take and patient states that he understand that he may be having metastatic disease and patient does not want any intervention including biopsies at this time-patient is competent to make decisions and patient states that he understands the consequences that  Vascular surgery has been requested to evaluate the patient  Monitor input output and BMP  Correct electrolytes and whenever necessary basis  Analgesia as per IM  Monitor for any hypoventilation and hypercarbia  PUD and DVT prophylaxis as per IM      Case discussed with patient and nursing    Patient again understands that he may have a metastatic cancer of the head but he does not want any interventions including biopsies at this time-if patient's clinical status or decision changes regarding course of care please call            Electronically signed by:  Cem Ram MD    3/8/2023    1:08 PM.

## 2023-03-08 NOTE — H&P
Hospital Medicine History & Physical      PCP: No primary care provider on file. Date of Admission: 3/8/2023    Time of Service: 0200    Date of Service: Pt seen/examined on 3/8/2023 and admitted to Inpatient with expected LOS greater than two midnights due to medical therapy. Historian: Self, ED documentation, Epic chart review, Care Everywhere    Chief Concern:    Chief Complaint   Patient presents with    Abdominal Pain     RUQ pain. Started Monday. Last BM this weekend. Emesis x1     History Of Present Illness:      Rashmi Ramirez is a 68-year-old male with no known significant past medical history aside from noting he was told he has had high blood pressure in the past without any treatment who presents to [facility] emergency department with complaint of right upper quadrant pain since this past Monday. He also notes he has been having shortness of breath and coughing for quite some time but most noticeable within the last month as well. The pain is isolated to the right upper quadrant, does not radiate, describes as a sharp stabbing pain, average 8 out of 10 without any alleviating or propagating factors. His evaluation in the emergency department included laboratory studies, CT of the abdomen pelvis with IV contrast, and CT chest without contrast.  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 Given the hematoma, vascular surgery was consulted by the ED for possible bleed, vascular surgery did not feel the hematoma was secondary to AAA; however, they do would like to see the patient in morning.   Additionally, CT of the chest showed multiple pulmonary nodules with a dominant mass in the right lower lobe consistent with metastatic disease with notable left scapular erosion. Pertinent laboratory studies show sodium 130, BUN 22, mildly elevated bilirubin at 1.8, and a WBC of 15.9. Patient received multiple rounds of IV pain medication in the emergency department. Hospital team was consulted to admit this patient for infrarenal AAA and right adrenal hemorrhage with corresponding hematoma. Past Medical History:          Diagnosis Date    Hypertension        Past Surgical History:      History reviewed. No pertinent surgical history. Medications Prior to Admission:      Prior to Admission medications    Not on File       Allergies:  Codeine    Social History:      The patient currently lives at home. TOBACCO:   reports that he has quit smoking. His smoking use included cigarettes. He has never used smokeless tobacco.  ETOH:   reports no history of alcohol use. E-cigarette/Vaping       Questions Responses    E-cigarette/Vaping Use     Start Date     Passive Exposure     Quit Date     Counseling Given     Comments           Family History:      Reviewed in detail at bedside with patient; does not recall pertinent family history    History reviewed. No pertinent family history. REVIEW OF SYSTEMS COMPLETED:   Pertinent positives as noted in the HPI. All other systems reviewed and negative. PHYSICAL EXAM PERFORMED:    /78   Pulse 88   Temp 97.9 °F (36.6 °C) (Oral)   Resp 20   Ht 5' 9\" (1.753 m)   Wt 187 lb 6.4 oz (85 kg)   SpO2 96%   BMI 27.67 kg/m²     General appearance:  No apparent distress, appears stated age and cooperative. HEENT:  Normal cephalic, atraumatic without obvious deformity. Pupils equal, round, and reactive to light. Extra ocular muscles intact. Conjunctivae/corneas clear. Neck: Supple, with full range of motion. No jugular venous distention. Trachea midline. Respiratory:  Normal respiratory effort.   Bilateral breath sounds auscultated, crackles noted posteriorly in both lungs  Cardiovascular:  Regular rate and rhythm with normal S1/S2 without murmurs, rubs or gallops. Abdomen: Soft, tenderness noted to the right upper quadrant and epigastric area, guarding, no rebound tenderness, right CVA tenderness as well, non-distended with normal bowel sounds. Musculoskeletal:  No clubbing, cyanosis or edema bilaterally. Full range of motion without deformity. Skin: Skin color, texture, turgor normal.  No rashes or lesions. Neurologic:  Neurovascularly intact without any focal sensory/motor deficits. Cranial nerves: II-XII intact, grossly non-focal.  Psychiatric:  Alert and oriented, thought content appropriate, normal insight  Capillary Refill: Brisk,3 seconds, normal  Peripheral Pulses: +2 palpable, equal bilaterally     Labs:     Recent Labs     03/07/23  2316   WBC 15.9*   HGB 13.6   HCT 40.8        Recent Labs     03/07/23  2316   *   K 4.3   CL 95*   CO2 21   BUN 22*   CREATININE 1.0   CALCIUM 9.4     Recent Labs     03/07/23  2316   AST 24   ALT 12   BILITOT 1.8*   ALKPHOS 117     Recent Labs     03/08/23  0055   INR 1.24*       Urinalysis:      Lab Results   Component Value Date/Time    NITRU Negative 03/08/2023 01:29 AM    WBCUA 3-5 03/08/2023 01:29 AM    BACTERIA Rare 03/08/2023 01:29 AM    RBCUA 3-4 03/08/2023 01:29 AM    BLOODU MODERATE 03/08/2023 01:29 AM    SPECGRAV 1.010 03/08/2023 01:29 AM    GLUCOSEU Negative 03/08/2023 01:29 AM     Radiology:     I have reviewed the radiographic results for this encounter with the following interpretation(s); see report(s) below:    CT Chest W/O Contrast   Final Result   1. Few bilateral pulmonary nodules including a dominant mass in the right   lower lobe compatible with malignancy with metastatic disease. 2. Mediastinal and right hilar lymphadenopathy. 3. Erosions of the left scapula compatible with osseous metastatic disease. Osteomyelitis could appear similar, but felt less likely given the additional   findings.    4. See same day CT abdomen regarding the upper abdominal findings. 5. Normal caliber thoracic aorta. 6. Mild emphysema. CT ABDOMEN PELVIS W IV CONTRAST Additional Contrast? None   Final Result   1. Hyperdense masses in the expected location of the right adrenal gland with   adjacent retroperitoneal hematoma. Findings are most compatible with   hemorrhagic adrenal metastatic disease. 2. Multiple foci of hepatic metastatic disease. 3. Large infrarenal abdominal aortic aneurysm measuring up to 5.1 cm. There   is low attenuation along the wall of the aorta surrounding the lumen, most   compatible with intramural hematoma or thrombus. This is of undetermined   stability without a comparison exam.  Recommend vascular consultation. Findings discussed directly with Hoa Zaidi at approximately 12:15 a.m.   on 03/08/2023. XR CHEST PORTABLE   Preliminary Result   Mild nonspecific interstitial prominence in the right lung base could be   atelectasis or pneumonia. Indeterminate pulmonary nodule in the left upper lobe.       Recommend correlation with CT chest.             EKG:  I have reviewed the EKG with the following interpretation in the absence of a cardiologist: Pending    Consults:    IP CONSULT TO VASCULAR SURGERY  IP CONSULT TO HOSPITALIST  IP CONSULT TO SPIRITUAL SERVICES    ASSESSMENT:    Active Hospital Problems    Diagnosis Date Noted    Infrarenal abdominal aortic aneurysm (AAA) without rupture [I71.43] 03/08/2023     Priority: High    Mass of middle lobe of right lung [R91.8] 03/08/2023     Priority: Medium    Right adrenal mass ([address] [E27.8] 03/08/2023     Priority: Medium    Nontraumatic retroperitoneal hematoma [K66.1] 03/08/2023     Priority: Medium    Hyponatremia [E87.1] 03/08/2023     Priority: Medium       PLAN:    Infrarenal AAA  ~ 5.1cm, with right adrenal mass and corresponding hemorrhage/hematoma concerning for bleed, Vascular Sx was consulted by ED, thought to not be 2/2 AAA  - Vascular to eval in AM  - Continue pain control    Metastatic Disease  - No established cancer history, in fact, no established primary care  - RML mass suspicious as source point, notable masses along left scapula, liver, and right adrenal gland  - Patient not amenable to discussing evaluation and treatment options at this time  - Spiritual Services consulted to assist    COPD  - Presumably, crackles with dyspnea  - DuoNebs ordered  - ~45 p-y smoking history, last cigarette two days ago    Hypertension  - Labetalol PRN for BP goal < 160/99      DVT Prophylaxis: SCDs  SRMB Prophylaxis: Protonix  Diet: Diet NPO Exceptions are: Ice Chips, Sips of Water with Meds, Sips of Clear Liquids  Code Status: Full Code    PT/OT Eval Status: N/A at this time    Dispo - 1-2 days per clinical improvement    Chava Levy, APRN - CNP    Thank you No primary care provider on file. for the opportunity to be involved in this patient's care.  If you have any questions or concerns please feel free to contact me at [phone].---Anticipated co-signer, Dr. Princess Krishna    (Please note that portions of this note were completed with a voice recognition program. Efforts were made to edit the dictations but occasionally words are mis-transcribed.)

## 2023-03-08 NOTE — CONSULTS
Thanks for consulting [facility] for the care of [facility]. Full consult will follow  Please call with questions at-  24 Hrs Answering service [phone]  Perfect serve, or cell phone 7 am - [address] [facility], [place]   [facility] neph[facility]  [facility]. [facility]

## 2023-03-08 NOTE — ACP (ADVANCE CARE PLANNING)
Advance Care Planning     Advance Care Planning Inpatient Note  Spiritual Care Department    Today's Date: 3/8/2023  Unit: [facility] C[facility] PCU    Received request from IDT Member. Upon review of chart and communication with care team, patient's decision making abilities are not in question. . Patient and 2 sisters and daughter-in-law  was/were present in the room during visit. Goals of ACP Conversation:  Discuss advance care planning documents    Health Care Decision Makers:       Primary Decision Maker: VONNIE LÓPEZ - Brother/Sister - [phone]    Secondary Decision Maker: Bautistagwendolyn Nava - Child - [phone]    Secondary Decision Maker: Deena briseno - Brother/Sister - [phone]  Summary:  Completed New Documents  Updated Healthcare Decision [facility]    Advance Care Planning Documents (Patient Wishes):  Healthcare Power of /Advance Directive Appointment of Health Care Agent  Living Will/Advance Directive     Assessment:  Pt with sisters and daughter-in-law in room. Pt ha two children, Braxton Inman and Thomas Palmer. Pt named sister Miesha Cisse as primary decision maker, Braxton Mask his son as secondary decision maker and his sister Manny Roberson as another secondary decision maker. New document completed. Interventions:  Provided education on documents for clarity and greater understanding  Assisted in the completion of documents according to patient's wishes at this time    Care Preferences Communicated:     Hospitalization:  If the patient's health worsens and it becomes clear that the chance of recovery is unlikely,     the patient prefers comfort-focused treatment without hospitalization. Ventilation:   If the patient, in their present state of health, suddenly became very ill and unable to breathe on their own,     the patient would desire the use of a ventilator (breathing machine).     If their health worsens and it becomes clear that the change of recovery is unlikely,     the patient would NOT desire the use of a ventilator (breathing machine). Resuscitation:  In the event the patient's heart stopped as a result of an underlying serious health condition, the patient communicates a preference for      resuscitative attempts (CPR). Outcomes/Plan:  New advance directive completed. Returned original document(s) to patient, as well as copies for distribution to appointed agents  Copy of advance directive given to staff to scan into medical record.   Teach Back Method used to verify the patient's and/or Healthcare Decision Maker's understanding of key information in the advance directive documents    Electronically signed by Chaplain Hi on 3/8/2023 at 10:20 AM

## 2023-03-08 NOTE — CONSULTS
Vascular Surgery Consultation    Date of Admission:  3/7/2023 10:55 PM  Date of Consultation:  3/8/2023    PCP:  No primary care provider on file. Chief Complaint: RUQ pain and SOB    History of Present Illness: We are asked to see this patient in consultation by Dr. Carmina Sanches regarding an AAA. Consuelo Joyce is a 69 y.o. male who presented to ED with above complaints. CT scans in ED showing intact 5cm AAA, hepatic and adrenal masses with evidence of probable bleed in Right adrenal, and lung mass. Denies other abdominal pain or back pain. Long smoking history and is currently a current everyday smoker. Past Medical History:  Past Medical History:   Diagnosis Date    Hypertension        Past Surgical History:  History reviewed. No pertinent surgical history. Home Medications:   Prior to Admission medications    Not on File        Facility Administered Medications:    sodium chloride flush  5-40 mL IntraVENous 2 times per day    pantoprazole  40 mg Oral QAM AC       Allergies:  Codeine     Social History:      Social History     Socioeconomic History    Marital status: Single     Spouse name: Not on file    Number of children: Not on file    Years of education: Not on file    Highest education level: Not on file   Occupational History    Not on file   Tobacco Use    Smoking status: Former     Types: Cigarettes    Smokeless tobacco: Never   Substance and Sexual Activity    Alcohol use: Never    Drug use: Never    Sexual activity: Not on file   Other Topics Concern    Not on file   Social History Narrative    Not on file     Social Determinants of Health     Financial Resource Strain: Not on file   Food Insecurity: Not on file   Transportation Needs: Not on file   Physical Activity: Not on file   Stress: Not on file   Social Connections: Not on file   Intimate Partner Violence: Not on file   Housing Stability: Not on file       Family History:    History reviewed.  No pertinent family history. Review of Systems:  A 14 point review of systems was completed. Pertinent positives identified in the HPI, all other review of systems negative. Physical Examination:    /78   Pulse 88   Temp 97.9 °F (36.6 °C) (Oral)   Resp 20   Ht 5' 9\" (1.753 m)   Wt 187 lb 6.4 oz (85 kg)   SpO2 96%   BMI 27.67 kg/m²        Admission Weight: 170 lb (77.1 kg)       General appearance: NAD  Eyes: PERRLA  Neck: no JVD, no lymphadenopathy. Respiratory: effort is unlabored, no crackles, wheezes or rubs. Cardiovascular: regular, no murmur. No carotid bruits. No edema or varicosities. Pulses:    femoral PT   RIGHT 2 2   LEFT 2 2   GI: abdomen soft, nondistended, no organomegaly. Aorta not palpable. Musculoskeletal: strength and tone normal.  Extremities: warm and pink. Skin: no dermatitis or ulceration. Neuro/psychiatric: grossly intact. MEDICAL DECISION MAKING/TESTING        CT: images personally reviewed and interpreted. 5.1cm AAA with no evidence of leak or rupture. Hemorrhagic Left adrenal mass and intra hepatic mass. Labs:   CBC:   Recent Labs     03/07/23 2316 03/08/23  0450   WBC 15.9* 14.2*   HGB 13.6 12.4*   HCT 40.8 37.0*   MCV 90.7 91.2    192     BMP:   Recent Labs     03/07/23 2316 03/08/23  0450   * 128*   K 4.3 4.2   CL 95* 94*   CO2 21 23   BUN 22* 24*   CREATININE 1.0 0.9   CALCIUM 9.4 8.7     Cardiac Enzymes: No results for input(s): CKTOTAL, CKMB, CKMBINDEX, TROPONINI in the last 72 hours.   PT/INR:   Recent Labs     03/08/23  0055   PROTIME 15.6*   INR 1.24*     APTT:   Recent Labs     03/08/23 0055   APTT 28.7     Liver Profile:  Lab Results   Component Value Date/Time    AST 21 03/08/2023 04:50 AM    ALT 11 03/08/2023 04:50 AM    BILITOT 1.5 03/08/2023 04:50 AM    ALKPHOS 96 03/08/2023 04:50 AM   No results found for: CHOL, HDL, TRIG  TSH:  No results found for: TSH  UA:   Lab Results   Component Value Date/Time    COLORU Yellow 03/08/2023 01:29 AM PHUR 5.5 03/08/2023 01:29 AM    WBCUA 3-5 03/08/2023 01:29 AM    RBCUA 3-4 03/08/2023 01:29 AM    BACTERIA Rare 03/08/2023 01:29 AM    CLARITYU Clear 03/08/2023 01:29 AM    SPECGRAV 1.010 03/08/2023 01:29 AM    LEUKOCYTESUR Negative 03/08/2023 01:29 AM    UROBILINOGEN 1.0 03/08/2023 01:29 AM    BILIRUBINUR Negative 03/08/2023 01:29 AM    BLOODU MODERATE 03/08/2023 01:29 AM    GLUCOSEU Negative 03/08/2023 01:29 AM       Assessment:  RUQ pain likely secondary to adrenal mass with hemorrhage. Probable metastatic lung CA  Asymptomatic AAA. Recommendations: Proceed with diagnostic studies regarding probable diagnosis of lung CA. No plans for AAA repair at present. Would consider repair when >5.5cm depending on life expectancy.

## 2023-03-08 NOTE — PROGRESS NOTES
/86   Pulse 90   Temp 98.1 °F (36.7 °C) (Oral)   Resp 20   Ht 5' 9\" (1.753 m)   Wt 187 lb 6.4 oz (85 kg)   SpO2 96%   BMI 27.67 kg/m²  on room air. Pt resting quietly in bed, denies pain, discomfort or shortness of breath at this time. Lungs diminished. NSR on tele. IVF's infusing at ordered rate on MAR. Pt denies any needs at this time, other than asking about eating. Writer will send message to MD regarding. Call light within reach. Pt instructed to call out for any needs and assistance. Will continue to monitor.   Maggy Juarez RN  3/8/2023

## 2023-03-08 NOTE — ED PROVIDER NOTES
[facility] C4 PCU  EMERGENCY DEPARTMENT ENCOUNTER        Pt Name: Barbara Willingham  MRN: [id number]  [place] 1955  Date of evaluation: 3/7/2023  Provider: Flakito Cueto MD  PCP: No primary care provider on file. Note Started: 11:03 PM EST 3/7/23    CHIEF COMPLAINT       Chief Complaint   Patient presents with    Abdominal Pain     RUQ pain. Started Monday. Last BM this weekend. Emesis x1       HISTORY OF PRESENT ILLNESS: 1 or more Elements             Barbara Willingham is a 69 y.o. male who presents with RUQ pain for 3 days which is worsening. No radiation. He had 1 episode of vomiting with the first attack. Was initially intermittent but now more constant. Poor appetite. Pain did not worsen when he did manage to eat. No fevers but during the first episode he broke out in a sweat. Last BM was Saturday. He has not had blood in his stools. No other pain sxs. When the pain is severe he feels like it takes his breath away. No meds taken for sxs. No prior episodes of this. Pain is 8/10 presently and sharp in nature. No urinary sxs. No respiratory sxs,     Nursing Notes were all reviewed and agreed with or any disagreements were addressed in the HPI. REVIEW OF SYSTEMS :      Review of Systems    Positives and Pertinent negatives as per HPI. SURGICAL HISTORY   History reviewed. No pertinent surgical history. CURRENTMEDICATIONS       There are no discharge medications for this patient. ALLERGIES     Codeine    FAMILYHISTORY     History reviewed. No pertinent family history.      SOCIAL HISTORY       Social History     Tobacco Use    Smoking status: Former     Types: Cigarettes    Smokeless tobacco: Never   Substance Use Topics    Alcohol use: Never    Drug use: Never       SCREENINGS        Alyssa Coma Scale  Eye Opening: Spontaneous  Best Verbal Response: Oriented  Best Motor Response: Obeys commands  Alyssa Coma Scale Score: 15                CIWA Assessment  BP: 123/78  Heart Rate: 88           PHYSICAL EXAM  1 or more Elements     ED Triage Vitals [03/07/23 2256]   BP Temp Temp Source Heart Rate Resp SpO2 Height Weight   (!) 178/91 98.6 °F (37 °C) Oral (!) 104 20 97 % 5' 9\" (1.753 m) 170 lb (77.1 kg)       Physical Exam  Vitals and nursing note reviewed. Constitutional:       Appearance: Normal appearance. He is well-developed. He is not ill-appearing. HENT:      Head: Normocephalic and atraumatic. Right Ear: External ear normal.      Left Ear: External ear normal.      Nose: Nose normal.   Eyes:      General: No scleral icterus. Right eye: No discharge. Left eye: No discharge. Conjunctiva/sclera: Conjunctivae normal.   Cardiovascular:      Rate and Rhythm: Normal rate and regular rhythm. Heart sounds: Normal heart sounds. Pulmonary:      Effort: Pulmonary effort is normal. No respiratory distress. Breath sounds: Normal breath sounds. No wheezing or rales. Abdominal:      General: Bowel sounds are normal. There is distension. Palpations: Abdomen is soft. Tenderness: There is abdominal tenderness in the right upper quadrant. There is right CVA tenderness and guarding. There is no rebound. Positive signs include Pascual's sign. Negative signs include McBurney's sign. Musculoskeletal:      Cervical back: Neck supple. Skin:     Coloration: Skin is not pale. Neurological:      Mental Status: He is alert.    Psychiatric:         Mood and Affect: Mood normal.         Behavior: Behavior normal.           DIAGNOSTIC RESULTS   LABS:    Labs Reviewed   CBC WITH AUTO DIFFERENTIAL - Abnormal; Notable for the following components:       Result Value    WBC 15.9 (*)     Neutrophils Absolute 14.5 (*)     Lymphocytes Absolute 0.5 (*)     All other components within normal limits   COMPREHENSIVE METABOLIC PANEL W/ REFLEX TO MG FOR LOW K - Abnormal; Notable for the following components:    Sodium 130 (*)     Chloride 95 (*)     Glucose 107 (*)     BUN 22 (*)     Total Bilirubin 1.8 (*)     All other components within normal limits   LIPASE - Abnormal; Notable for the following components:    Lipase 10.0 (*)     All other components within normal limits   URINALYSIS WITH REFLEX TO CULTURE - Abnormal; Notable for the following components:    Ketones, Urine 15 (*)     Blood, Urine MODERATE (*)     All other components within normal limits   PROTIME-INR - Abnormal; Notable for the following components:    Protime 15.6 (*)     INR 1.24 (*)     All other components within normal limits   MICROSCOPIC URINALYSIS - Abnormal; Notable for the following components:    Bacteria, UA Rare (*)     All other components within normal limits   CBC WITH AUTO DIFFERENTIAL - Abnormal; Notable for the following components:    WBC 14.2 (*)     RBC 4.05 (*)     Hemoglobin 12.4 (*)     Hematocrit 37.0 (*)     Neutrophils Absolute 11.7 (*)     Lymphocytes Absolute 0.8 (*)     All other components within normal limits   LACTATE, SEPSIS   APTT   LACTATE, SEPSIS   COMPREHENSIVE METABOLIC PANEL W/ REFLEX TO MG FOR LOW K   TYPE AND SCREEN       When ordered only abnormal lab results are displayed. All other labs were within normal range or not returned as of this dictation. EKG: Twelve-lead EKG as read and interpreted by myself shows normal sinus rhythm at a rate of 94 bpm, per interval QRS QTc and upper normal axis no acute ischemic findings. He does have significant artifact though. RADIOLOGY:   Non-plain film images such as CT, Ultrasound and MRI are read by the radiologist. Plain radiographic images are visualized and preliminarily interpreted by the ED Provider with the below findings:        Interpretation per the Radiologist below, if available at the time of this note:    CT Chest W/O Contrast   Final Result   1. Few bilateral pulmonary nodules including a dominant mass in the right   lower lobe compatible with malignancy with metastatic disease. 2. Mediastinal and right hilar lymphadenopathy. 3. Erosions of the left scapula compatible with osseous metastatic disease. Osteomyelitis could appear similar, but felt less likely given the additional   findings. 4. See same day CT abdomen regarding the upper abdominal findings. 5. Normal caliber thoracic aorta. 6. Mild emphysema. CT ABDOMEN PELVIS W IV CONTRAST Additional Contrast? None   Final Result   1. Hyperdense masses in the expected location of the right adrenal gland with   adjacent retroperitoneal hematoma. Findings are most compatible with   hemorrhagic adrenal metastatic disease. 2. Multiple foci of hepatic metastatic disease. 3. Large infrarenal abdominal aortic aneurysm measuring up to 5.1 cm. There   is low attenuation along the wall of the aorta surrounding the lumen, most   compatible with intramural hematoma or thrombus. This is of undetermined   stability without a comparison exam.  Recommend vascular consultation. Findings discussed directly with Carrie Guajardo at approximately 12:15 a.m.   on 03/08/2023. XR CHEST PORTABLE   Preliminary Result   Mild nonspecific interstitial prominence in the right lung base could be   atelectasis or pneumonia. Indeterminate pulmonary nodule in the left upper lobe. Recommend correlation with CT chest.           No results found. No results found. PROCEDURES   Unless otherwise noted below, none     Procedures    CRITICAL CARE TIME   Total Critical Care time was 55 minutes, excluding separately reportable procedures. There was a high probability of clinically significant/life threatening deterioration in the patient's condition which required my urgent intervention. PAST MEDICAL HISTORY      has a past medical history of Hypertension.      EMERGENCY DEPARTMENT COURSE and DIFFERENTIAL DIAGNOSIS/MDM:   Vitals:    Vitals:    03/08/23 0154 03/08/23 0224 03/08/23 0332 03/08/23 0354   BP: (!) 146/96 (!) 160/103 123/78    Pulse: 91 98 88    Resp: 19 14 20 Temp:   97.9 °F (36.6 °C)    TempSrc:   Oral    SpO2: 95% 98% 96%    Weight:    187 lb 6.4 oz (85 kg)   Height:           Patient was given the following medications:  Medications   sodium chloride flush 0.9 % injection 5-40 mL (has no administration in time range)   sodium chloride flush 0.9 % injection 5-40 mL (has no administration in time range)   0.9 % sodium chloride infusion (has no administration in time range)   ondansetron (ZOFRAN-ODT) disintegrating tablet 4 mg (has no administration in time range)     Or   ondansetron (ZOFRAN) injection 4 mg (has no administration in time range)   polyethylene glycol (GLYCOLAX) packet 17 g (has no administration in time range)   acetaminophen (TYLENOL) tablet 650 mg (has no administration in time range)     Or   acetaminophen (TYLENOL) suppository 650 mg (has no administration in time range)   0.9 % sodium chloride infusion ( IntraVENous New Bag 3/8/23 0357)   pantoprazole (PROTONIX) tablet 40 mg (has no administration in time range)   morphine (PF) injection 2 mg (has no administration in time range)   HYDROmorphone (DILAUDID) injection 0.25 mg ( IntraVENous See Alternative 3/8/23 0241)     Or   HYDROmorphone (DILAUDID) injection 0.5 mg (0.5 mg IntraVENous Given 3/8/23 0241)   labetalol (NORMODYNE;TRANDATE) injection 10 mg (10 mg IntraVENous Given 3/8/23 0241)   0.9 % sodium chloride bolus (0 mLs IntraVENous Stopped 3/8/23 0027)   morphine sulfate (PF) injection 4 mg (4 mg IntraVENous Given 3/7/23 2319)   iopamidol (ISOVUE-370) 76 % injection 75 mL (75 mLs IntraVENous Given 3/7/23 2351)   HYDROmorphone (DILAUDID) injection 0.5 mg (0.5 mg IntraVENous Given 3/8/23 0051)             Is this patient to be included in the SEP-1 Core Measure due to severe sepsis or septic shock? No   Exclusion criteria - the patient is NOT to be included for SEP-1 Core Measure due to:   Infection is not suspected    Chronic Conditions:     CONSULTS: (Who and What was discussed)  IP CONSULT TO VASCULAR SURGERY  IP CONSULT TO HOSPITALIST  IP CONSULT TO SPIRITUAL SERVICES                CC/HPI Summary, DDx, ED Course, and Reassessment: Adult male who came in adult male who comes in with right upper quadrant abdominal pain for 3 days. On exam the patient is nontoxic-appearing with stable vital signs except tachycardia and an elevated blood pressure. Though his abnormal vitals could be due to his pain I am concerned for acute cholecystitis given his right upper quadrant tenderness and positive Pascual sign. Laboratory studies are ordered. CT scan of the abdomen pelvis also ordered. Patient is given morphine IV for his pain given its severity. Patient is also given a liter bolus of normal saline. Laboratory studies are reviewed and show leukocytosis with neutrophilic predominance increasing my suspicion for infection and possible sepsis. Patient has hyponatremia and hypochloremia slightly elevated BUN. CT scan of the abdomen and pelvis was read and interpreted by the radiologist.  I was contacted by Dr. Fernanda Saucedo about his CT abdomen. She explained that there is concern for metastatic disease involving the right adrenal gland with hemorrhagic conversion extending into the right retroperitoneum. There is also concern of a 5.1 cm infrarenal aortic aneurysm of uncertain hematoma of the wall. Because of the CT of the abdomen findings a CT scan of his chest was ordered. A consult was also placed to vascular surgery. I spoke to Dr. Vanessa Barbour about the aneurysm. He reviewed the images and he is not concerned for any rupture or leakage of the aneurysm. He said that there is no hematoma of the wall just a thrombus. CT scan of his chest shows further findings concerning for metastatic neoplastic disease. I went in to reassess the patient and to discuss his findings.   Patient's pain was not controlled with morphine he was given IV Dilaudid which upon further reassessment did help with his symptoms. I discussed with the patient all 4 findings and our concerns including for malignancy. The patient denied having any recent weight changes. The patient seems doubtful about wanting further care however I explained to the patient that this point we do not know the extent of his disease neither the treatment options. I recommend that the patient is admitted to the hospital.  The patient's sister is in the room and she also agrees with him and encourages him to stay. He eventually agrees. A consult is placed to the hospitalist on duty. We discussed the patient's presenting complaints physical exam and diagnostic work-up. He has agreed to admit this patient to his service. Patient vital signs including his blood pressure and his heart rate both improved after administration of adequate analgesia and fluids. I am the Primary Clinician of Record. FINAL IMPRESSION      1. Widespread metastatic malignant neoplastic disease ([address]    2. Adrenal hemorrhage ([address]    3. Mass of right lung    4. Infrarenal abdominal aortic aneurysm (AAA) without rupture    5. Electrolyte disturbance          DISPOSITION/PLAN     DISPOSITION Admitted 03/08/2023 02:12:48 AM      PATIENT REFERRED TO:  No follow-up provider specified. DISCHARGE MEDICATIONS:  There are no discharge medications for this patient. DISCONTINUED MEDICATIONS:  There are no discharge medications for this patient.              (Please note that portions of this note were completed with a voice recognition program.  Efforts were made to edit the dictations but occasionally words are mis-transcribed.)    Marilu Rubi MD (electronically signed)           Marilu Rubi MD  03/08/23 8666

## 2023-03-08 NOTE — CONSULTS
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 male presented to   the hospital on 3/7/2023 with right upper quadrant pain for 3 days. Also shortness of breath and cough. He had a CT scan of the abdomen and pelvis done in the ED as well as CT chest.  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 Also AAA found. Vascular surgery was consulted in the ED for possible bleed. He also was found to have nodule in the right lower lobe consistent with metastatic disease with left scapular effusion. On admission his bilirubin was found to be high at 1.8, WBC high. Also low sodium. He is on IV fluids  He denies drinking alcohol  He is a smoker 45-pack-year smoking history.       ROS:     Seen with-multiple family members on 3/8/2023    positives in bold   Constitutional:  fever, chills, weakness, weight change, fatigue  Skin:  rash, pruritus, hair loss, bruising, dry skin, petechiae  Head, Face, Neck   headaches, swelling, cervical adenopathy  Respiratory: shortness of breath, cough, or wheezing  Cardiovascular: chest pain, palpitations, dizzy, edema  Gastrointestinal: nausea, vomiting, diarrhea, constipation,belly pain    Yellow skin, blood in stool  Musculoskeletal:  back pain, muscle weakness, gait problems,       joint pain or swelling. Genitourinary:  dysuria, poor urine flow, flank pain, blood in urine  Neurologic:  vertigo, TIA'S, syncope, seizures, focal weakness  Psychosocial:  insomnia, anxiety, or depression. Additional positive findings:                    All other remaining systems are negative or unable to obtain        PMH/PSH/SH/Family History:     Past Medical History:   Diagnosis Date    Hypertension        History reviewed. No pertinent surgical history. reports that he has quit smoking. His smoking use included cigarettes. He has never used smokeless tobacco. He reports that he does not drink alcohol and does not use drugs. family history is not on file.          Medication:     Current Facility-Administered Medications: sodium chloride flush 0.9 % injection 5-40 mL, 5-40 mL, IntraVENous, 2 times per day  sodium chloride flush 0.9 % injection 5-40 mL, 5-40 mL, IntraVENous, PRN  0.9 % sodium chloride infusion, , IntraVENous, PRN  ondansetron (ZOFRAN-ODT) disintegrating tablet 4 mg, 4 mg, Oral, Q8H PRN **OR** ondansetron (ZOFRAN) injection 4 mg, 4 mg, IntraVENous, Q6H PRN  polyethylene glycol (GLYCOLAX) packet 17 g, 17 g, Oral, Daily PRN  acetaminophen (TYLENOL) tablet 650 mg, 650 mg, Oral, Q6H PRN **OR** acetaminophen (TYLENOL) suppository 650 mg, 650 mg, Rectal, Q6H PRN  0.9 % sodium chloride infusion, , IntraVENous, Continuous  pantoprazole (PROTONIX) tablet 40 mg, 40 mg, Oral, QAM AC  HYDROmorphone (DILAUDID) injection 0.25 mg, 0.25 mg, IntraVENous, Q3H PRN **OR** HYDROmorphone (DILAUDID) injection 0.5 mg, 0.5 mg, IntraVENous, Q3H PRN  labetalol (NORMODYNE;TRANDATE) injection 10 mg, 10 mg, IntraVENous, Q4H PRN  melatonin disintegrating tablet 5 mg, 5 mg, Oral, Nightly       Vitals :     Vitals:    03/08/23 1445   BP: 123/77   Pulse: 96   Resp: 20   Temp: 98.2 °F (36.8 °C)   SpO2: 95%       I & O :       Intake/Output Summary (Last 24 hours) at 3/8/2023 1511  Last data filed at 3/8/2023 1453  Gross per 24 hour   Intake 1043.89 ml   Output 200 ml   Net 843.89 ml        Physical Examination :     General appearance: Alert, lethargic, lying flat,  Respiratory: NAD  Cardiovascular: NAD, Edema none  Abdomen: -Soft, nontender no distention  Other relevant findings:-       LABS:     Recent Labs     03/07/23  2316 03/08/23  0450   WBC 15.9* 14.2*   HGB 13.6 12.4*   HCT 40.8 37.0*    192     Recent Labs     03/07/23  2316 03/08/23  0450   * 128*   K 4.3 4.2   CL 95* 94*   CO2 21 23   BUN 22* 24*   CREATININE 1.0 0.9   GLUCOSE 107* 118*            Thanks,   [facility]  [address] [facility], [address]  Office: [phone]  Fax: [phone]

## 2023-03-08 NOTE — CARE COORDINATION
Case Management Assessment  Initial Evaluation    Date/Time of Evaluation: 3/8/2023 2:04 PM  Assessment Completed by: Kay Mosqueda RN    If patient is discharged prior to next notation, then this note serves as note for discharge by case management. Patient Name: Iliana Hilliard                   YOB: 1955  Diagnosis: Infrarenal abdominal aortic aneurysm (AAA) without rupture [I71.43]                   Date / Time: 3/7/2023 10:55 PM    Patient Admission Status: Inpatient   Readmission Risk (Low < 19, Mod (19-27), High > 27): Readmission Risk Score: 10    Current PCP: No primary care provider on file. PCP verified by CM? (P) No (has no PCP. PCP list given and information on [facility])    Chart Reviewed: Yes      History Provided by: (P) Patient  Patient Orientation: (P) Alert and Oriented    Patient Cognition: (P) Alert    Hospitalization in the last 30 days (Readmission):  No    If yes, Readmission Assessment in CM Navigator will be completed.     Advance Directives:      Code Status: Full Code   Patient's Primary Decision Maker is: (P) Legal Next of Kin    Primary Decision Maker: VONNIE LÓPEZ - Brother/Sister - [phone]    Secondary Decision Maker: Deshaun Julien - Child - [phone]    Secondary Decision Maker: Deena briseno - Brother/Sister - [phone]    Discharge Planning:    Patient lives with: (P) Family Members Type of Home: (P) Other (Comment) (Condominium)  Primary Care Giver: (P) Self  Patient Support Systems include: (P) Spouse/Significant Other, Children   Current Financial resources: (P) Medicare  Current community resources: (P) None  Current services prior to admission: (P) None            Current DME:              Type of Home Care services:  None    ADLS  Prior functional level: (P) Independent in ADLs/IADLs  Current functional level: (P) Assistance with the following:, Housework, Mobility    PT AM-PAC:   /24  OT AM-PAC:   /24    Family can provide assistance at DC: (P) Yes  Would you like Case Management to discuss the discharge plan with any other family members/significant others, and if so, who? (P) Yes (SON OR SISTER)  Plans to Return to Present Housing: (P) Yes  Other Identified Issues/Barriers to RETURNING to current housing: Yes  Potential Assistance needed at discharge: N/A            Potential DME:    Patient expects to discharge to: (P) Other (comment) (To condo with sister)  Plan for transportation at discharge:      Financial    Payor: Erin Troncoso / Plan: [facility] HMO / Product Type: *No Product type* /     Does insurance require precert for SNF: Yes    Potential assistance Purchasing Medications: (P) No  Meds-to-Beds request:      No Pharmacies Listed    Notes:    Factors facilitating achievement of predicted outcomes: Family support, Motivated, Cooperative, Pleasant, and Good insight into deficits    Barriers to discharge: No barriers identified    Additional Case Management Notes: Patient is from home with his sister. He states he is normally independent with all care and still drives and is ambulatory. However patient reports being very weak and having no energy the last few days. As well as pain limiting his mobility. Offered to arrange home care and patient declined. He states he wants to wait until he gets back home to see how he is going to be and if he will need anything. Oncology consult pending for lung mass with mets to liver. Vascular consulted for AAA but states no urgent need for surgical intervention. The Plan for Transition of Care is related to the following treatment goals of Infrarenal abdominal aortic aneurysm (AAA) without rupture [Z40.90]    IF APPLICABLE: The Patient and/or patient representative Toshia Aquino and his family were provided with a choice of provider and agrees with the discharge plan.  Freedom of choice list with basic dialogue that supports the patient's individualized plan of care/goals and shares the quality data associated with the providers was provided to: (P) Patient      The Patient and/or Patient Representative Agree with the Discharge Plan?  (P) Yes    Iraida Marcus RN  Case Management Department  Ph: [phone] Fax: [phone]

## 2023-03-09 ENCOUNTER — APPOINTMENT (OUTPATIENT)
Dept: GENERAL RADIOLOGY | Age: 68
DRG: 180 | End: 2023-03-09
Payer: MEDICARE

## 2023-03-09 ENCOUNTER — APPOINTMENT (OUTPATIENT)
Dept: MRI IMAGING | Age: 68
DRG: 180 | End: 2023-03-09
Payer: MEDICARE

## 2023-03-09 ENCOUNTER — ANESTHESIA (OUTPATIENT)
Dept: ENDOSCOPY | Age: 68
End: 2023-03-09
Payer: MEDICARE

## 2023-03-09 LAB
ANION GAP SERPL CALCULATED.3IONS-SCNC: 13 MMOL/L (ref 3–16)
APPEARANCE BAL (LAVAGE): ABNORMAL
BUN BLDV-MCNC: 24 MG/DL (ref 7–20)
CALCIUM SERPL-MCNC: 9 MG/DL (ref 8.3–10.6)
CHLORIDE BLD-SCNC: 97 MMOL/L (ref 99–110)
CLOT EVALUATION BAL: ABNORMAL
CO2: 24 MMOL/L (ref 21–32)
COLOR LAVAGE: ABNORMAL
CREAT SERPL-MCNC: 0.9 MG/DL (ref 0.8–1.3)
GFR SERPL CREATININE-BSD FRML MDRD: >60 ML/MIN/{1.73_M2}
GLUCOSE BLD-MCNC: 88 MG/DL (ref 70–99)
HCT VFR BLD CALC: 35.4 % (ref 40.5–52.5)
HEMOGLOBIN: 12 G/DL (ref 13.5–17.5)
LYMPHOCYTES, BAL: 17 % (ref 5–10)
MACROPHAGES, BAL: 11 % (ref 90–95)
MCH RBC QN AUTO: 31 PG (ref 26–34)
MCHC RBC AUTO-ENTMCNC: 34 G/DL (ref 31–36)
MCV RBC AUTO: 91.4 FL (ref 80–100)
NUMBER OF CELLS COUNTED BAL (LAVAGE): 100
PDW BLD-RTO: 13.3 % (ref 12.4–15.4)
PLATELET # BLD: 204 K/UL (ref 135–450)
PMV BLD AUTO: 7.8 FL (ref 5–10.5)
POTASSIUM SERPL-SCNC: 4.3 MMOL/L (ref 3.5–5.1)
RBC # BLD: 3.87 M/UL (ref 4.2–5.9)
RBC, BAL: ABNORMAL /CUMM
SEGMENTED NEUTROPHILS, BAL: 72 % (ref 5–10)
SODIUM BLD-SCNC: 134 MMOL/L (ref 136–145)
WBC # BLD: 10.2 K/UL (ref 4–11)
WBC/EPI CELLS BAL: 1133 /CUMM

## 2023-03-09 PROCEDURE — 3603165200 HC BRNCHSC EBUS GUIDED SAMPL 1/2 NODE STATION/STRUX: Performed by: STUDENT IN AN ORGANIZED HEALTH CARE EDUCATION/TRAINING PROGRAM

## 2023-03-09 PROCEDURE — 87186 SC STD MICRODIL/AGAR DIL: CPT

## 2023-03-09 PROCEDURE — 71045 X-RAY EXAM CHEST 1 VIEW: CPT

## 2023-03-09 PROCEDURE — 80048 BASIC METABOLIC PNL TOTAL CA: CPT

## 2023-03-09 PROCEDURE — 0B9F8ZX DRAINAGE OF RIGHT LOWER LUNG LOBE, VIA NATURAL OR ARTIFICIAL OPENING ENDOSCOPIC, DIAGNOSTIC: ICD-10-PCS | Performed by: STUDENT IN AN ORGANIZED HEALTH CARE EDUCATION/TRAINING PROGRAM

## 2023-03-09 PROCEDURE — 6360000002 HC RX W HCPCS: Performed by: NURSE PRACTITIONER

## 2023-03-09 PROCEDURE — A9579 GAD-BASE MR CONTRAST NOS,1ML: HCPCS | Performed by: NURSE ANESTHETIST, CERTIFIED REGISTERED

## 2023-03-09 PROCEDURE — 6360000004 HC RX CONTRAST MEDICATION: Performed by: NURSE ANESTHETIST, CERTIFIED REGISTERED

## 2023-03-09 PROCEDURE — 88342 IMHCHEM/IMCYTCHM 1ST ANTB: CPT

## 2023-03-09 PROCEDURE — 7100000011 HC PHASE II RECOVERY - ADDTL 15 MIN: Performed by: STUDENT IN AN ORGANIZED HEALTH CARE EDUCATION/TRAINING PROGRAM

## 2023-03-09 PROCEDURE — 88305 TISSUE EXAM BY PATHOLOGIST: CPT

## 2023-03-09 PROCEDURE — 1200000000 HC SEMI PRIVATE

## 2023-03-09 PROCEDURE — 88104 CYTOPATH FL NONGYN SMEARS: CPT

## 2023-03-09 PROCEDURE — 6360000002 HC RX W HCPCS

## 2023-03-09 PROCEDURE — 3609010800 HC BRONCHOSCOPY ALVEOLAR LAVAGE: Performed by: STUDENT IN AN ORGANIZED HEALTH CARE EDUCATION/TRAINING PROGRAM

## 2023-03-09 PROCEDURE — 88341 IMHCHEM/IMCYTCHM EA ADD ANTB: CPT

## 2023-03-09 PROCEDURE — 88112 CYTOPATH CELL ENHANCE TECH: CPT

## 2023-03-09 PROCEDURE — 87205 SMEAR GRAM STAIN: CPT

## 2023-03-09 PROCEDURE — 31624 DX BRONCHOSCOPE/LAVAGE: CPT | Performed by: STUDENT IN AN ORGANIZED HEALTH CARE EDUCATION/TRAINING PROGRAM

## 2023-03-09 PROCEDURE — 88173 CYTOPATH EVAL FNA REPORT: CPT

## 2023-03-09 PROCEDURE — 70553 MRI BRAIN STEM W/O & W/DYE: CPT

## 2023-03-09 PROCEDURE — 88177 CYTP FNA EVAL EA ADDL: CPT

## 2023-03-09 PROCEDURE — 3609011100 HC BRONCHOSCOPY BRUSHINGS: Performed by: STUDENT IN AN ORGANIZED HEALTH CARE EDUCATION/TRAINING PROGRAM

## 2023-03-09 PROCEDURE — 3609011200 HC BRONCHOSCOPY W/TRANSBRONCL NDL ASPIR BX EA ADDL LOBE: Performed by: STUDENT IN AN ORGANIZED HEALTH CARE EDUCATION/TRAINING PROGRAM

## 2023-03-09 PROCEDURE — 87077 CULTURE AEROBIC IDENTIFY: CPT

## 2023-03-09 PROCEDURE — 89051 BODY FLUID CELL COUNT: CPT

## 2023-03-09 PROCEDURE — 3209999900 FLUORO FOR SURGICAL PROCEDURES

## 2023-03-09 PROCEDURE — 3609011900 HC BRONCHOSCOPY NEEDLE BX TRACHEA MAIN STEM&/BRON: Performed by: STUDENT IN AN ORGANIZED HEALTH CARE EDUCATION/TRAINING PROGRAM

## 2023-03-09 PROCEDURE — 31623 DX BRONCHOSCOPE/BRUSH: CPT | Performed by: STUDENT IN AN ORGANIZED HEALTH CARE EDUCATION/TRAINING PROGRAM

## 2023-03-09 PROCEDURE — 07D78ZX EXTRACTION OF THORAX LYMPHATIC, VIA NATURAL OR ARTIFICIAL OPENING ENDOSCOPIC, DIAGNOSTIC: ICD-10-PCS | Performed by: STUDENT IN AN ORGANIZED HEALTH CARE EDUCATION/TRAINING PROGRAM

## 2023-03-09 PROCEDURE — 31652 BRONCH EBUS SAMPLNG 1/2 NODE: CPT | Performed by: STUDENT IN AN ORGANIZED HEALTH CARE EDUCATION/TRAINING PROGRAM

## 2023-03-09 PROCEDURE — 6370000000 HC RX 637 (ALT 250 FOR IP)

## 2023-03-09 PROCEDURE — 2709999900 HC NON-CHARGEABLE SUPPLY: Performed by: STUDENT IN AN ORGANIZED HEALTH CARE EDUCATION/TRAINING PROGRAM

## 2023-03-09 PROCEDURE — 2580000003 HC RX 258: Performed by: ANESTHESIOLOGY

## 2023-03-09 PROCEDURE — 7100000001 HC PACU RECOVERY - ADDTL 15 MIN: Performed by: STUDENT IN AN ORGANIZED HEALTH CARE EDUCATION/TRAINING PROGRAM

## 2023-03-09 PROCEDURE — 99233 SBSQ HOSP IP/OBS HIGH 50: CPT | Performed by: STUDENT IN AN ORGANIZED HEALTH CARE EDUCATION/TRAINING PROGRAM

## 2023-03-09 PROCEDURE — 7100000010 HC PHASE II RECOVERY - FIRST 15 MIN: Performed by: STUDENT IN AN ORGANIZED HEALTH CARE EDUCATION/TRAINING PROGRAM

## 2023-03-09 PROCEDURE — 88172 CYTP DX EVAL FNA 1ST EA SITE: CPT

## 2023-03-09 PROCEDURE — 6370000000 HC RX 637 (ALT 250 FOR IP): Performed by: INTERNAL MEDICINE

## 2023-03-09 PROCEDURE — 76000 FLUOROSCOPY <1 HR PHYS/QHP: CPT

## 2023-03-09 PROCEDURE — 85027 COMPLETE CBC AUTOMATED: CPT

## 2023-03-09 PROCEDURE — 3700000000 HC ANESTHESIA ATTENDED CARE: Performed by: STUDENT IN AN ORGANIZED HEALTH CARE EDUCATION/TRAINING PROGRAM

## 2023-03-09 PROCEDURE — 36415 COLL VENOUS BLD VENIPUNCTURE: CPT

## 2023-03-09 PROCEDURE — 2720000010 HC SURG SUPPLY STERILE: Performed by: STUDENT IN AN ORGANIZED HEALTH CARE EDUCATION/TRAINING PROGRAM

## 2023-03-09 PROCEDURE — 6370000000 HC RX 637 (ALT 250 FOR IP): Performed by: NURSE PRACTITIONER

## 2023-03-09 PROCEDURE — 3700000001 HC ADD 15 MINUTES (ANESTHESIA): Performed by: STUDENT IN AN ORGANIZED HEALTH CARE EDUCATION/TRAINING PROGRAM

## 2023-03-09 PROCEDURE — 2500000003 HC RX 250 WO HCPCS

## 2023-03-09 PROCEDURE — 2580000003 HC RX 258: Performed by: NURSE PRACTITIONER

## 2023-03-09 PROCEDURE — 2580000003 HC RX 258

## 2023-03-09 PROCEDURE — 7100000000 HC PACU RECOVERY - FIRST 15 MIN: Performed by: STUDENT IN AN ORGANIZED HEALTH CARE EDUCATION/TRAINING PROGRAM

## 2023-03-09 PROCEDURE — 87070 CULTURE OTHR SPECIMN AEROBIC: CPT

## 2023-03-09 RX ORDER — DEXAMETHASONE SODIUM PHOSPHATE 4 MG/ML
4 INJECTION, SOLUTION INTRA-ARTICULAR; INTRALESIONAL; INTRAMUSCULAR; INTRAVENOUS; SOFT TISSUE
Status: DISCONTINUED | OUTPATIENT
Start: 2023-03-09 | End: 2023-03-09 | Stop reason: HOSPADM

## 2023-03-09 RX ORDER — LIDOCAINE HYDROCHLORIDE 20 MG/ML
INJECTION, SOLUTION EPIDURAL; INFILTRATION; INTRACAUDAL; PERINEURAL PRN
Status: DISCONTINUED | OUTPATIENT
Start: 2023-03-09 | End: 2023-03-09 | Stop reason: SDUPTHER

## 2023-03-09 RX ORDER — DEXAMETHASONE SODIUM PHOSPHATE 10 MG/ML
INJECTION INTRAMUSCULAR; INTRAVENOUS PRN
Status: DISCONTINUED | OUTPATIENT
Start: 2023-03-09 | End: 2023-03-09 | Stop reason: SDUPTHER

## 2023-03-09 RX ORDER — OXYCODONE HYDROCHLORIDE 5 MG/1
5 TABLET ORAL PRN
Status: DISCONTINUED | OUTPATIENT
Start: 2023-03-09 | End: 2023-03-09 | Stop reason: HOSPADM

## 2023-03-09 RX ORDER — ONDANSETRON 2 MG/ML
INJECTION INTRAMUSCULAR; INTRAVENOUS PRN
Status: DISCONTINUED | OUTPATIENT
Start: 2023-03-09 | End: 2023-03-09 | Stop reason: SDUPTHER

## 2023-03-09 RX ORDER — SODIUM CHLORIDE, SODIUM LACTATE, POTASSIUM CHLORIDE, CALCIUM CHLORIDE 600; 310; 30; 20 MG/100ML; MG/100ML; MG/100ML; MG/100ML
INJECTION, SOLUTION INTRAVENOUS CONTINUOUS PRN
Status: DISCONTINUED | OUTPATIENT
Start: 2023-03-09 | End: 2023-03-09 | Stop reason: SDUPTHER

## 2023-03-09 RX ORDER — MIDAZOLAM HYDROCHLORIDE 1 MG/ML
2 INJECTION INTRAMUSCULAR; INTRAVENOUS
Status: DISCONTINUED | OUTPATIENT
Start: 2023-03-09 | End: 2023-03-09 | Stop reason: HOSPADM

## 2023-03-09 RX ORDER — IPRATROPIUM BROMIDE AND ALBUTEROL SULFATE 2.5; .5 MG/3ML; MG/3ML
SOLUTION RESPIRATORY (INHALATION)
Status: COMPLETED
Start: 2023-03-09 | End: 2023-03-09

## 2023-03-09 RX ORDER — SODIUM CHLORIDE 0.9 % (FLUSH) 0.9 %
5-40 SYRINGE (ML) INJECTION EVERY 12 HOURS SCHEDULED
Status: DISCONTINUED | OUTPATIENT
Start: 2023-03-09 | End: 2023-03-09 | Stop reason: HOSPADM

## 2023-03-09 RX ORDER — SODIUM CHLORIDE 0.9 % (FLUSH) 0.9 %
5-40 SYRINGE (ML) INJECTION PRN
Status: DISCONTINUED | OUTPATIENT
Start: 2023-03-09 | End: 2023-03-09 | Stop reason: HOSPADM

## 2023-03-09 RX ORDER — IPRATROPIUM BROMIDE AND ALBUTEROL SULFATE 2.5; .5 MG/3ML; MG/3ML
1 SOLUTION RESPIRATORY (INHALATION) EVERY 4 HOURS
Status: ACTIVE | OUTPATIENT
Start: 2023-03-09 | End: 2023-03-09

## 2023-03-09 RX ORDER — SODIUM CHLORIDE, SODIUM LACTATE, POTASSIUM CHLORIDE, CALCIUM CHLORIDE 600; 310; 30; 20 MG/100ML; MG/100ML; MG/100ML; MG/100ML
INJECTION, SOLUTION INTRAVENOUS CONTINUOUS
Status: DISCONTINUED | OUTPATIENT
Start: 2023-03-09 | End: 2023-03-09 | Stop reason: HOSPADM

## 2023-03-09 RX ORDER — ONDANSETRON 2 MG/ML
4 INJECTION INTRAMUSCULAR; INTRAVENOUS
Status: DISCONTINUED | OUTPATIENT
Start: 2023-03-09 | End: 2023-03-09 | Stop reason: HOSPADM

## 2023-03-09 RX ORDER — OXYCODONE HYDROCHLORIDE 5 MG/1
10 TABLET ORAL PRN
Status: DISCONTINUED | OUTPATIENT
Start: 2023-03-09 | End: 2023-03-09 | Stop reason: HOSPADM

## 2023-03-09 RX ORDER — ROCURONIUM BROMIDE 10 MG/ML
INJECTION, SOLUTION INTRAVENOUS PRN
Status: DISCONTINUED | OUTPATIENT
Start: 2023-03-09 | End: 2023-03-09 | Stop reason: SDUPTHER

## 2023-03-09 RX ORDER — HYDRALAZINE HYDROCHLORIDE 20 MG/ML
10 INJECTION INTRAMUSCULAR; INTRAVENOUS
Status: DISCONTINUED | OUTPATIENT
Start: 2023-03-09 | End: 2023-03-09 | Stop reason: HOSPADM

## 2023-03-09 RX ORDER — PROPOFOL 10 MG/ML
INJECTION, EMULSION INTRAVENOUS PRN
Status: DISCONTINUED | OUTPATIENT
Start: 2023-03-09 | End: 2023-03-09 | Stop reason: SDUPTHER

## 2023-03-09 RX ORDER — SODIUM CHLORIDE 9 MG/ML
INJECTION, SOLUTION INTRAVENOUS PRN
Status: DISCONTINUED | OUTPATIENT
Start: 2023-03-09 | End: 2023-03-09 | Stop reason: HOSPADM

## 2023-03-09 RX ORDER — SODIUM CHLORIDE, SODIUM LACTATE, POTASSIUM CHLORIDE, CALCIUM CHLORIDE 600; 310; 30; 20 MG/100ML; MG/100ML; MG/100ML; MG/100ML
INJECTION, SOLUTION INTRAVENOUS CONTINUOUS
Status: DISCONTINUED | OUTPATIENT
Start: 2023-03-09 | End: 2023-03-10

## 2023-03-09 RX ORDER — ZOLPIDEM TARTRATE 5 MG/1
5 TABLET ORAL NIGHTLY PRN
Status: DISCONTINUED | OUTPATIENT
Start: 2023-03-09 | End: 2023-03-10 | Stop reason: HOSPADM

## 2023-03-09 RX ORDER — IPRATROPIUM BROMIDE AND ALBUTEROL SULFATE 2.5; .5 MG/3ML; MG/3ML
1 SOLUTION RESPIRATORY (INHALATION)
Status: COMPLETED | OUTPATIENT
Start: 2023-03-09 | End: 2023-03-09

## 2023-03-09 RX ORDER — MEPERIDINE HYDROCHLORIDE 50 MG/ML
12.5 INJECTION INTRAMUSCULAR; INTRAVENOUS; SUBCUTANEOUS EVERY 5 MIN PRN
Status: DISCONTINUED | OUTPATIENT
Start: 2023-03-09 | End: 2023-03-09 | Stop reason: HOSPADM

## 2023-03-09 RX ORDER — ACETAMINOPHEN 325 MG/1
650 TABLET ORAL
Status: DISCONTINUED | OUTPATIENT
Start: 2023-03-09 | End: 2023-03-09 | Stop reason: HOSPADM

## 2023-03-09 RX ORDER — DIPHENHYDRAMINE HYDROCHLORIDE 50 MG/ML
12.5 INJECTION INTRAMUSCULAR; INTRAVENOUS
Status: DISCONTINUED | OUTPATIENT
Start: 2023-03-09 | End: 2023-03-09 | Stop reason: HOSPADM

## 2023-03-09 RX ADMIN — IPRATROPIUM BROMIDE AND ALBUTEROL SULFATE 1 AMPULE: 2.5; .5 SOLUTION RESPIRATORY (INHALATION) at 10:44

## 2023-03-09 RX ADMIN — Medication 10 ML: at 23:40

## 2023-03-09 RX ADMIN — SODIUM CHLORIDE, PRESERVATIVE FREE 10 ML: 5 INJECTION INTRAVENOUS at 20:21

## 2023-03-09 RX ADMIN — ROCURONIUM BROMIDE 10 MG: 10 SOLUTION INTRAVENOUS at 11:55

## 2023-03-09 RX ADMIN — PANTOPRAZOLE SODIUM 40 MG: 40 TABLET, DELAYED RELEASE ORAL at 06:25

## 2023-03-09 RX ADMIN — ONDANSETRON 4 MG: 2 INJECTION INTRAMUSCULAR; INTRAVENOUS at 11:03

## 2023-03-09 RX ADMIN — HYDROMORPHONE HYDROCHLORIDE 0.5 MG: 0.5 INJECTION, SOLUTION INTRAMUSCULAR; INTRAVENOUS; SUBCUTANEOUS at 23:38

## 2023-03-09 RX ADMIN — PROPOFOL 30 MG: 10 INJECTION, EMULSION INTRAVENOUS at 11:35

## 2023-03-09 RX ADMIN — SUGAMMADEX 200 MG: 100 INJECTION, SOLUTION INTRAVENOUS at 12:13

## 2023-03-09 RX ADMIN — GADOTERIDOL 17 ML: 279.3 INJECTION, SOLUTION INTRAVENOUS at 16:47

## 2023-03-09 RX ADMIN — ROCURONIUM BROMIDE 50 MG: 10 SOLUTION INTRAVENOUS at 11:03

## 2023-03-09 RX ADMIN — Medication 5 MG: at 20:20

## 2023-03-09 RX ADMIN — SODIUM CHLORIDE, SODIUM LACTATE, POTASSIUM CHLORIDE, AND CALCIUM CHLORIDE: .6; .31; .03; .02 INJECTION, SOLUTION INTRAVENOUS at 10:54

## 2023-03-09 RX ADMIN — PROPOFOL 150 MG: 10 INJECTION, EMULSION INTRAVENOUS at 11:03

## 2023-03-09 RX ADMIN — LIDOCAINE HYDROCHLORIDE 80 MG: 20 INJECTION, SOLUTION EPIDURAL; INFILTRATION; INTRACAUDAL; PERINEURAL at 11:03

## 2023-03-09 RX ADMIN — HYDROMORPHONE HYDROCHLORIDE 0.25 MG: 0.5 INJECTION, SOLUTION INTRAMUSCULAR; INTRAVENOUS; SUBCUTANEOUS at 03:07

## 2023-03-09 RX ADMIN — DEXAMETHASONE SODIUM PHOSPHATE 4 MG: 10 INJECTION INTRAMUSCULAR; INTRAVENOUS at 11:03

## 2023-03-09 RX ADMIN — HYDROMORPHONE HYDROCHLORIDE 0.5 MG: 0.5 INJECTION, SOLUTION INTRAMUSCULAR; INTRAVENOUS; SUBCUTANEOUS at 08:26

## 2023-03-09 RX ADMIN — SODIUM CHLORIDE, SODIUM LACTATE, POTASSIUM CHLORIDE, AND CALCIUM CHLORIDE: .6; .31; .03; .02 INJECTION, SOLUTION INTRAVENOUS at 10:56

## 2023-03-09 RX ADMIN — SODIUM CHLORIDE, PRESERVATIVE FREE 10 ML: 5 INJECTION INTRAVENOUS at 08:26

## 2023-03-09 ASSESSMENT — PAIN SCALES - GENERAL
PAINLEVEL_OUTOF10: 1
PAINLEVEL_OUTOF10: 0
PAINLEVEL_OUTOF10: 8
PAINLEVEL_OUTOF10: 0
PAINLEVEL_OUTOF10: 0
PAINLEVEL_OUTOF10: 3
PAINLEVEL_OUTOF10: 5
PAINLEVEL_OUTOF10: 0
PAINLEVEL_OUTOF10: 0
PAINLEVEL_OUTOF10: 5
PAINLEVEL_OUTOF10: 0
PAINLEVEL_OUTOF10: 0

## 2023-03-09 ASSESSMENT — PAIN DESCRIPTION - DESCRIPTORS
DESCRIPTORS: ACHING
DESCRIPTORS: SORE
DESCRIPTORS: SORE

## 2023-03-09 ASSESSMENT — PAIN DESCRIPTION - ORIENTATION
ORIENTATION: MID;UPPER

## 2023-03-09 ASSESSMENT — PAIN - FUNCTIONAL ASSESSMENT: PAIN_FUNCTIONAL_ASSESSMENT: ACTIVITIES ARE NOT PREVENTED

## 2023-03-09 ASSESSMENT — PAIN DESCRIPTION - LOCATION
LOCATION: BACK

## 2023-03-09 ASSESSMENT — PAIN DESCRIPTION - FREQUENCY: FREQUENCY: INTERMITTENT

## 2023-03-09 ASSESSMENT — PAIN DESCRIPTION - ONSET: ONSET: ON-GOING

## 2023-03-09 ASSESSMENT — PAIN DESCRIPTION - PAIN TYPE: TYPE: ACUTE PAIN

## 2023-03-09 ASSESSMENT — LIFESTYLE VARIABLES: SMOKING_STATUS: 1

## 2023-03-09 NOTE — PROGRESS NOTES
Pt stated he did not want to sign his consent at this time and wants some more questions answered by the MD prior to his Bronchoscopy today.

## 2023-03-09 NOTE — PROGRESS NOTES
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 male presented to   the hospital on 3/7/2023 with right upper quadrant pain for 3 days. Also shortness of breath and cough. He had a CT scan of the abdomen and pelvis done in the ED as well as CT chest.  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 Also AAA found. Vascular surgery was consulted in the ED for possible bleed. He also was found to have nodule in the right lower lobe consistent with metastatic disease with left scapular effusion. On admission his bilirubin was found to be high at 1.8, WBC high. Also low sodium. He is on IV fluids  He denies drinking alcohol  He is a smoker 45-pack-year smoking history.       ROS:     Seen with-multiple family members on 3/8/2023    positives in bold   Constitutional:  fever, chills, weakness, weight change, fatigue  Skin: rash, pruritus, hair loss, bruising, dry skin, petechiae  Head, Face, Neck   headaches, swelling,  cervical adenopathy  Respiratory: shortness of breath, cough, or wheezing  Cardiovascular: chest pain, palpitations, dizzy, edema  Gastrointestinal: nausea, vomiting, diarrhea, constipation,belly pain    Yellow skin, blood in stool  Musculoskeletal:  back pain, muscle weakness, gait problems,       joint pain or swelling. Genitourinary:  dysuria, poor urine flow, flank pain, blood in urine  Neurologic:  vertigo, TIA'S, syncope, seizures, focal weakness  Psychosocial:  insomnia, anxiety, or depression. Additional positive findings:                    All other remaining systems are negative or unable to obtain        PMH/PSH/SH/Family History:     Past Medical History:   Diagnosis Date    Hypertension        History reviewed. No pertinent surgical history. reports that he has quit smoking. His smoking use included cigarettes. He has never used smokeless tobacco. He reports that he does not drink alcohol and does not use drugs. family history is not on file.          Medication:     Current Facility-Administered Medications: lactated ringers IV soln infusion, , IntraVENous, Continuous  sodium chloride flush 0.9 % injection 5-40 mL, 5-40 mL, IntraVENous, 2 times per day  sodium chloride flush 0.9 % injection 5-40 mL, 5-40 mL, IntraVENous, PRN  0.9 % sodium chloride infusion, , IntraVENous, PRN  lactated ringers IV soln infusion, , IntraVENous, Continuous  sodium chloride flush 0.9 % injection 5-40 mL, 5-40 mL, IntraVENous, 2 times per day  sodium chloride flush 0.9 % injection 5-40 mL, 5-40 mL, IntraVENous, PRN  0.9 % sodium chloride infusion, , IntraVENous, PRN  acetaminophen (TYLENOL) tablet 650 mg, 650 mg, Oral, Once PRN  meperidine (DEMEROL) injection 12.5 mg, 12.5 mg, IntraVENous, Q5 Min PRN  HYDROmorphone (DILAUDID) injection 0.25 mg, 0.25 mg, IntraVENous, Q5 Min PRN  HYDROmorphone (DILAUDID) injection 0.5 mg, 0.5 mg, IntraVENous, Q5 Min PRN  oxyCODONE (ROXICODONE) immediate release tablet 5 mg, 5 mg, Oral, PRN **OR** oxyCODONE (ROXICODONE) immediate release tablet 10 mg, 10 mg, Oral, PRN  ondansetron (ZOFRAN) injection 4 mg, 4 mg, IntraVENous, Once PRN  Dexamethasone Sodium Phosphate injection 4 mg, 4 mg, IntraVENous, Once PRN  midazolam (VERSED) injection 2 mg, 2 mg, IntraVENous, Once PRN  diphenhydrAMINE (BENADRYL) injection 12.5 mg, 12.5 mg, IntraVENous, Once PRN  hydrALAZINE (APRESOLINE) injection 10 mg, 10 mg, IntraVENous, Q15 Min PRN  ipratropium-albuterol (DUONEB) nebulizer solution 1 ampule, 1 ampule, Inhalation, Q4H  sodium chloride flush 0.9 % injection 5-40 mL, 5-40 mL, IntraVENous, 2 times per day  sodium chloride flush 0.9 % injection 5-40 mL, 5-40 mL, IntraVENous, PRN  0.9 % sodium chloride infusion, , IntraVENous, PRN  ondansetron (ZOFRAN-ODT) disintegrating tablet 4 mg, 4 mg, Oral, Q8H PRN **OR** ondansetron (ZOFRAN) injection 4 mg, 4 mg, IntraVENous, Q6H PRN  polyethylene glycol (GLYCOLAX) packet 17 g, 17 g, Oral, Daily PRN  acetaminophen (TYLENOL) tablet 650 mg, 650 mg, Oral, Q6H PRN **OR** acetaminophen (TYLENOL) suppository 650 mg, 650 mg, Rectal, Q6H PRN  pantoprazole (PROTONIX) tablet 40 mg, 40 mg, Oral, QAM AC  HYDROmorphone (DILAUDID) injection 0.25 mg, 0.25 mg, IntraVENous, Q3H PRN **OR** HYDROmorphone (DILAUDID) injection 0.5 mg, 0.5 mg, IntraVENous, Q3H PRN  labetalol (NORMODYNE;TRANDATE) injection 10 mg, 10 mg, IntraVENous, Q4H PRN  melatonin disintegrating tablet 5 mg, 5 mg, Oral, Nightly  Facility-Administered Medications Ordered in Other Encounters: lactated ringers IV soln infusion, , IntraVENous, Continuous PRN  ondansetron (ZOFRAN) injection, , IntraVENous, PRN  dexamethasone (DECADRON) injection, , IntraVENous, PRN  lidocaine PF 2 % injection, , IntraVENous, PRN  rocuronium (ZEMURON) injection, , IntraVENous, PRN  propofol injection, , IntraVENous, PRN       Vitals :     Vitals: 03/09/23 0824   BP: (!) 148/85   Pulse: 88   Resp: 16   Temp: 97.8 °F (36.6 °C)   SpO2: 96%       I & O :       Intake/Output Summary (Last 24 hours) at 3/9/2023 1126  Last data filed at 3/9/2023 9398  Gross per 24 hour   Intake 1223.89 ml   Output 950 ml   Net 273.89 ml          Physical Examination :     General appearance: Alert, lethargic, lying flat,  Respiratory: NAD  Cardiovascular: NAD, Edema none  Abdomen: -Soft, nontender no distention  Other relevant findings:-       LABS:     Recent Labs     03/07/23 2316 03/08/23  0450 03/09/23  0834   WBC 15.9* 14.2* 10.2   HGB 13.6 12.4* 12.0*   HCT 40.8 37.0* 35.4*    192 204       Recent Labs     03/07/23 2316 03/08/23  0450 03/09/23  0834   * 128* 134*   K 4.3 4.2 4.3   CL 95* 94* 97*   CO2 21 23 24   BUN 22* 24* 24*   CREATININE 1.0 0.9 0.9   GLUCOSE 107* 118* 88              Thanks,   [facility]  [address], [facility] [place] [facility]  Office: [phone]  Fax: [phone]

## 2023-03-09 NOTE — CONSULTS
3/9/23 @ 16:08 per discussion with IR - they are aware of IR consult. As a side note they advised that a consult is not needed orders just need to be placed.  María San

## 2023-03-09 NOTE — CARE COORDINATION
Patient is undergoing a bronchoscopy with pulmonary today. On room air this AM. Patient with right upper lobe mass. Left upper lobe nodule. Hx of tobacco dependence and emphysema/COPD. Oncology consult pending. From home with sister in a condo and plans to return.  Will follow and assist as needed with any barriers to discharge IPTA however patient has increased weakness and may benefit for PT/OT eval.

## 2023-03-09 NOTE — SIGNIFICANT EVENT
EBUS with bronchoscopy biopsies and brushings were performed today and patient tolerated the procedure well with no immediately complications. His station 7 biopsy was suspicious with atypical cells, however, will need stainings by Pathologist for final result (which will result by tomorrow AM). He is scheduled for an IR CT guided biopsy of RLL lung mass if results are negative tomorrow morning. This was discussed with the patient, please keep NPO after midnight and hold DVT ppx.      Santiago Vega MD  [facility]

## 2023-03-09 NOTE — PROGRESS NOTES
Occupational Therapy/Physical Therapy    OT/PT orders received and appreciated, chart reviewed. Pt unavailable for eval due to bronchoscopy at 11 AM this morning. Will attempt to see patient later this afternoon or on 3/10. Thank you!     Cathie Nichols OTR/EDINSON Torres  PT, DPT #[id number]

## 2023-03-09 NOTE — PLAN OF CARE
Problem: Pain  Goal: Verbalizes/displays adequate comfort level or baseline comfort level  Outcome: Progressing  Note: Pt able to use pain scale 0-10, denies pain this shift.

## 2023-03-09 NOTE — ANESTHESIA POSTPROCEDURE EVALUATION
Department of Anesthesiology  Postprocedure Note    Patient: Nicola Raymundo  MRN: [id number]  YOB: 1955  Date of evaluation: 3/9/2023      Procedure Summary     Date: 03/09/23 Room / Location: [facility] [place] [address] / [facility]    Anesthesia Start: 1056 Anesthesia Stop: 5055    Procedures:       BRONCHOSCOPY ENDOBRONCHIAL ULTRASOUND      BRONCHOSCOPY/TRANSBRONCHIAL NEEDLE BIOPSY      BRONCHOSCOPY/TRANSBRONCHIAL NEEDLE BIOPSY ADDL LOBE      BRONCHOSCOPY BRUSHINGS      BRONCHOSCOPY ALVEOLAR LAVAGE Diagnosis:       Lung mass      (Lung mass)    Surgeons: Carmen Painting MD Responsible Provider: Jason Samuel MD    Anesthesia Type: general ASA Status: 4          Anesthesia Type: No value filed.     Conchis Phase I: Conchis Score: 10    Conchis Phase II:        Anesthesia Post Evaluation    Patient location during evaluation: PACU  Patient participation: complete - patient participated  Level of consciousness: awake  Pain score: 0  Airway patency: patent  Nausea & Vomiting: nausea  Complications: no  Cardiovascular status: blood pressure returned to baseline  Respiratory status: acceptable  Hydration status: euvolemic

## 2023-03-09 NOTE — ANESTHESIA PRE PROCEDURE
Department of Anesthesiology  Preprocedure Note       Name:  Shanthi Landaverde   Age:  69 y.o.  :  1955                                          MRN:  [id number]         Date:  3/9/2023      Surgeon: Jose Rodriguez):  Bossman Herndon MD    Procedure: Procedure(s):  BRONCHOSCOPY ENDOBRONCHIAL ULTRASOUND    Medications prior to admission:   Prior to Admission medications    Not on File       Current medications:    Current Facility-Administered Medications   Medication Dose Route Frequency Provider Last Rate Last Admin    sodium chloride flush 0.9 % injection 5-40 mL  5-40 mL IntraVENous 2 times per day Bae Briggs, APRN - CNP   10 mL at 23 0826    sodium chloride flush 0.9 % injection 5-40 mL  5-40 mL IntraVENous PRN Bae Briggs, APRN - CNP        0.9 % sodium chloride infusion   IntraVENous PRN Bae Briggs, APRN - CNP        ondansetron (ZOFRAN-ODT) disintegrating tablet 4 mg  4 mg Oral Q8H PRN Bae Briggs, APRN - CNP        Or    ondansetron (ZOFRAN) injection 4 mg  4 mg IntraVENous Q6H PRN Bae Briggs, APRN - CNP        polyethylene glycol (GLYCOLAX) packet 17 g  17 g Oral Daily PRN Bae Briggs, APRN - CNP        acetaminophen (TYLENOL) tablet 650 mg  650 mg Oral Q6H PRN Bae Briggs, APRN - CNP        Or    acetaminophen (TYLENOL) suppository 650 mg  650 mg Rectal Q6H PRN Bae Briggs, APRN - CNP        pantoprazole (PROTONIX) tablet 40 mg  40 mg Oral QAM AC Bae Briggs, APRN - CNP   40 mg at 23 0625    HYDROmorphone (DILAUDID) injection 0.25 mg  0.25 mg IntraVENous Q3H PRN Bae Briggs, APRN - CNP   0.25 mg at 23 0307    Or    HYDROmorphone (DILAUDID) injection 0.5 mg  0.5 mg IntraVENous Q3H PRN Bae Briggs, APRN - CNP   0.5 mg at 23 0826    labetalol (NORMODYNE;TRANDATE) injection 10 mg  10 mg IntraVENous Q4H PRN Bae Briggs, APRN - CNP   10 mg at 23 0241    melatonin disintegrating tablet 5 mg  5 mg Oral Nightly Leeika MD Dung   5 mg at 03/08/23 2001       Allergies: Allergies   Allergen Reactions    Codeine Rash       Problem List:    Patient Active Problem List   Diagnosis Code    Infrarenal abdominal aortic aneurysm (AAA) without rupture I71.43    Mass of right lung R91.8    Right adrenal mass (HCC) E27.8    Nontraumatic retroperitoneal hematoma K66.1    Hyponatremia E87.1    Mediastinal adenopathy R59.0    Current smoker F17.200    Leukocytosis D72.829    Centrilobular emphysema ([facility] [address] J43.2    Widespread metastatic malignant neoplastic disease ([facility] [address] C80.0    Adrenal hemorrhage (HCC) E27.49       Past Medical History:        Diagnosis Date    Hypertension        Past Surgical History:  History reviewed. No pertinent surgical history. Social History:    Social History     Tobacco Use    Smoking status: Former     Types: Cigarettes    Smokeless tobacco: Never   Substance Use Topics    Alcohol use: Never                                Counseling given: Not Answered      Vital Signs (Current):   Vitals:    03/09/23 0310 03/09/23 0337 03/09/23 0627 03/09/23 0824   BP: 121/77   (!) 148/85   Pulse: 74   88   Resp: 14 16  16   Temp: 98 °F (36.7 °C)   97.8 °F (36.6 °C)   TempSrc: Oral   Oral   SpO2: 94%   96%   Weight:   186 lb 3.2 oz (84.5 kg)    Height:                                                  BP Readings from Last 3 Encounters:   03/09/23 (!) 148/85       NPO Status:                                                                                 BMI:   Wt Readings from Last 3 Encounters:   03/09/23 186 lb 3.2 oz (84.5 kg)     Body mass index is 27.5 kg/m².     CBC:   Lab Results   Component Value Date/Time    WBC 10.2 03/09/2023 08:34 AM    RBC 3.87 03/09/2023 08:34 AM    HGB 12.0 03/09/2023 08:34 AM    HCT 35.4 03/09/2023 08:34 AM    MCV 91.4 03/09/2023 08:34 AM    RDW 13.3 03/09/2023 08:34 AM     03/09/2023 08:34 AM       CMP:   Lab Results   Component Value Date/Time     03/09/2023 08:34 AM    K 4.3 03/09/2023 08:34 AM    K 4.2 03/08/2023 04:50 AM    CL 97 03/09/2023 08:34 AM    CO2 24 03/09/2023 08:34 AM    BUN 24 03/09/2023 08:34 AM    CREATININE 0.9 03/09/2023 08:34 AM    AGRATIO 1.4 03/08/2023 04:50 AM    LABGLOM >60 03/09/2023 08:34 AM    GLUCOSE 88 03/09/2023 08:34 AM    PROT 6.2 03/08/2023 04:50 AM    CALCIUM 9.0 03/09/2023 08:34 AM    BILITOT 1.5 03/08/2023 04:50 AM    ALKPHOS 96 03/08/2023 04:50 AM    AST 21 03/08/2023 04:50 AM    ALT 11 03/08/2023 04:50 AM       POC Tests: No results for input(s): POCGLU, POCNA, POCK, POCCL, POCBUN, POCHEMO, POCHCT in the last 72 hours. Coags:   Lab Results   Component Value Date/Time    PROTIME 15.6 03/08/2023 12:55 AM    INR 1.24 03/08/2023 12:55 AM    APTT 28.7 03/08/2023 12:55 AM       HCG (If Applicable): No results found for: PREGTESTUR, PREGSERUM, HCG, HCGQUANT     ABGs: No results found for: PHART, PO2ART, VHT8EYG, RMU0GIH, BEART, G2RYENMS     Type & Screen (If Applicable):  No results found for: LABABO, LABRH    Drug/Infectious Status (If Applicable):  No results found for: HIV, HEPCAB    COVID-19 Screening (If Applicable): No results found for: COVID19        Anesthesia Evaluation  Patient summary reviewed and Nursing notes reviewed  Airway: Mallampati: II  TM distance: >3 FB   Neck ROM: full  Mouth opening: > = 3 FB   Dental:    (+) poor dentition      Pulmonary:normal exam    (+) COPD:  current smoker                           Cardiovascular:    (+) hypertension:,                   Neuro/Psych:   Negative Neuro/Psych ROS              GI/Hepatic/Renal: Neg GI/Hepatic/Renal ROS  (+) renal disease (adrenal mass):,           Endo/Other:    (+) malignancy/cancer (Metastatic Lung CA with mediastinal adenopathy). Abdominal:             Vascular: negative vascular ROS. Other Findings:           Anesthesia Plan      general     ASA 4       Induction: intravenous. MIPS: Postoperative opioids intended.   Anesthetic plan and risks discussed with patient. Plan discussed with CRNA.     Attending anesthesiologist reviewed and agrees with Preprocedure content                MARGARITA Tovar MD   3/9/2023

## 2023-03-09 NOTE — PROGRESS NOTES
Hospitalist Progress Note      PCP: No primary care provider on file. Date of Admission: 3/7/2023    Chief Complaint: Abdominal pain    Hospital Course: H&P reviewed  HPI:Anuj Julien is a 67-year-old male with no known significant past medical history aside from noting he was told he has had high blood pressure in the past without any treatment who presents to [facility] emergency department with complaint of right upper quadrant pain since this past Monday. He also notes he has been having shortness of breath and coughing for quite some time but most noticeable within the last month as well. The pain is isolated to the right upper quadrant, does not radiate, describes as a sharp stabbing pain, average 8 out of 10 without any alleviating or propagating factors. His evaluation in the emergency department included laboratory studies, CT of the abdomen pelvis with IV contrast, and CT chest without contrast.  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 Given the hematoma, vascular surgery was consulted by the ED for possible bleed, vascular surgery did not feel the hematoma was secondary to AAA; however, they do would like to see the patient in morning. Additionally, CT of the chest showed multiple pulmonary nodules with a dominant mass in the right lower lobe consistent with metastatic disease with notable left scapular erosion. Pertinent laboratory studies show sodium 130, BUN 22, mildly elevated bilirubin at 1.8, and a WBC of 15.9. Patient received multiple rounds of IV pain medication in the emergency department. Hospital team was consulted to admit this patient for infrarenal AAA and right adrenal hemorrhage with corresponding hematoma.       Subjective: S/p bronch and EBUS today. Patient satting on room air and denies any overt shortness of breath or belly pain today. Medications:  Reviewed    Infusion Medications    lactated ringers IV soln      sodium chloride       Scheduled Medications    sodium chloride flush  5-40 mL IntraVENous 2 times per day    pantoprazole  40 mg Oral QAM AC    melatonin  5 mg Oral Nightly     PRN Meds: sodium chloride flush, sodium chloride, ondansetron **OR** ondansetron, polyethylene glycol, acetaminophen **OR** acetaminophen, HYDROmorphone **OR** HYDROmorphone, labetalol      Intake/Output Summary (Last 24 hours) at 3/9/2023 1537  Last data filed at 3/9/2023 1214  Gross per 24 hour   Intake 1220 ml   Output 750 ml   Net 470 ml       Physical Exam Performed:    /74   Pulse 94   Temp 96.9 °F (36.1 °C) (Temporal)   Resp 16   Ht 5' 9\" (1.753 m)   Wt 186 lb 3.2 oz (84.5 kg)   SpO2 96%   BMI 27.50 kg/m²     General appearance: No apparent distress, appears stated age and cooperative. HEENT: Pupils equal, round,Conjunctivae/corneas clear. Neck: Supple, with full range of motion. No jugular venous distention. Trachea midline. Respiratory:  Normal respiratory effort. Clear to auscultation, bilaterally without Rales/Wheezes/Rhonchi. Cardiovascular: Regular rate and rhythm with normal S1/S2 without murmurs, rubs or gallops. Abdomen: Soft, non-tender, non-distended with normal bowel sounds. Musculoskeletal: No clubbing, cyanosis or edema bilaterally.     Skin: Warm and dry  Neurologic: Alert, speech clear with no overt facial droop  Psychiatric: Alert and oriented, thought content appropriate, normal insight      Labs:   Recent Labs     03/07/23 2316 03/08/23 0450 03/09/23  0834   WBC 15.9* 14.2* 10.2   HGB 13.6 12.4* 12.0*   HCT 40.8 37.0* 35.4*    192 204     Recent Labs     03/07/23 2316 03/08/23  0450 03/09/23  0834   * 128* 134*   K 4.3 4.2 4.3   CL 95* 94* 97*   CO2 21 23 24   BUN 22* 24* 24* CREATININE 1.0 0.9 0.9   CALCIUM 9.4 8.7 9.0     Recent Labs     03/07/23  2316 03/08/23  0450   AST 24 21   ALT 12 11   BILITOT 1.8* 1.5*   ALKPHOS 117 96     Recent Labs     03/08/23  0055   INR 1.24*     No results for input(s): Diya Wilkins in the last 72 hours. Urinalysis:      Lab Results   Component Value Date/Time    NITRU Negative 03/08/2023 01:29 AM    WBCUA 3-5 03/08/2023 01:29 AM    BACTERIA Rare 03/08/2023 01:29 AM    RBCUA 3-4 03/08/2023 01:29 AM    BLOODU MODERATE 03/08/2023 01:29 AM    SPECGRAV 1.010 03/08/2023 01:29 AM    GLUCOSEU Negative 03/08/2023 01:29 AM       Radiology:  XR CHEST PORTABLE   Final Result   No pneumothorax. Left upper lobe lung nodule. Right hilar adenopathy. XR CHEST PA LATERAL W FLUOROSCOPY   Final Result   Intraprocedural fluoroscopic spot images as above. See separate procedure   report for more information. FLUORO FOR SURGICAL PROCEDURES   Final Result   Intraprocedural fluoroscopic spot images as above. See separate procedure   report for more information. CT Chest W/O Contrast   Final Result   1. Few bilateral pulmonary nodules including a dominant mass in the right   lower lobe compatible with malignancy with metastatic disease. 2. Mediastinal and right hilar lymphadenopathy. 3. Erosions of the left scapula compatible with osseous metastatic disease. Osteomyelitis could appear similar, but felt less likely given the additional   findings. 4. See same day CT abdomen regarding the upper abdominal findings. 5. Normal caliber thoracic aorta. 6. Mild emphysema. CT ABDOMEN PELVIS W IV CONTRAST Additional Contrast? None   Final Result   1. Hyperdense masses in the expected location of the right adrenal gland with   adjacent retroperitoneal hematoma. Findings are most compatible with   hemorrhagic adrenal metastatic disease. 2. Multiple foci of hepatic metastatic disease.    3. Large infrarenal abdominal aortic aneurysm measuring up to 5.1 cm. There   is low attenuation along the wall of the aorta surrounding the lumen, most   compatible with intramural hematoma or thrombus. This is of undetermined   stability without a comparison exam.  Recommend vascular consultation. Findings discussed directly with Duwaine Apley at approximately 12:15 a.m.   on 03/08/2023. XR CHEST PORTABLE   Final Result   Mild nonspecific interstitial prominence in the right lung base could be   atelectasis or mild pneumonia. Indeterminate pulmonary nodule in the left upper lobe. Recommend correlation with CT chest.         MRI BRAIN W WO CONTRAST    (Results Pending)   CT NEEDLE BIOPSY LUNG PERCUTANEOUS    (Results Pending)       IP CONSULT TO VASCULAR SURGERY  IP CONSULT TO HOSPITALIST  IP CONSULT TO SPIRITUAL SERVICES  IP CONSULT TO ONCOLOGY  IP CONSULT TO PULMONOLOGY  IP CONSULT TO NEPHROLOGY  IP CONSULT TO INTERVENTIONAL RADIOLOGY    Assessment/Plan:    Active Hospital Problems    Diagnosis     Infrarenal abdominal aortic aneurysm (AAA) without rupture [I71.43]      Priority: Medium    Mass of right lung [R91.8]      Priority: Medium    Right adrenal mass ([address] [E27.8]      Priority: Medium    Nontraumatic retroperitoneal hematoma [K66.1]      Priority: Medium    Hyponatremia [E87.1]      Priority: Medium    Mediastinal adenopathy [R59.0]      Priority: Medium    Current smoker [F17.200]      Priority: Medium    Leukocytosis [D72.829]      Priority: Medium    Centrilobular emphysema (HCC) [J43.2]      Priority: Medium    Widespread metastatic malignant neoplastic disease ([address] [C80.0]      Priority: Medium    Adrenal hemorrhage (HCC) [E27.49]      Priority: Medium     RLL lung mass with suspected metastatic disease   -CT findings showed lung mass with mediastinal and paratracheal adenopathy, widespread metastatic disease of liver bone, lymph nodes and adrenal gland noted on imaging. Pulm and oncology consulted.  S/p bronch with EBUS on 3/9. MRI brain to eval for metastatic disease pending. Infrarenal AAA: about 5.1cm with right adrenal mass and corresponding hemorrhage/hematoma concerning for bleed. 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 Nephro assisting. Held IVF. Na improving. Monitor      Leucocytosis: suspected due to malignancy. UA neg for UTI. No overt pneumonia on imaging. Procalcitonin was mildly elevated. Wbc normalized on lab today and hold off abx. Follow clinically and BAL cultures. DVT Prophylaxis: SCDs   Diet: ADULT DIET; Regular  Diet NPO Exceptions are: [employer], Sips of Water with Meds, Sips of Clear Liquids  Code Status: Full Code  PT/OT Eval Status: Not needed at this time as ambulatory    Dispo -hard to estimate LOS.   Pending work-up and specialist recs    Appropriate for A1 Discharge Unit: No      Evelin Morales MD

## 2023-03-09 NOTE — PLAN OF CARE
Problem: Discharge Planning  Goal: Discharge to home or other facility with appropriate resources  Outcome: Progressing     Problem: Pain  Goal: Verbalizes/displays adequate comfort level or baseline comfort level  3/9/2023 0535 by Eliceo Alcocer RN  Outcome: Progressing  3/8/2023 2000 by Og Felipe RN  Outcome: Progressing  Note: Pt able to use pain scale 0-10, denies pain this shift.        Problem: Safety - Adult  Goal: Free from fall injury  Outcome: Progressing     Problem: Risk for Elopement  Goal: Patient will not exit the unit/facility without proper excort  Outcome: Progressing     Problem: Chronic Conditions and Co-morbidities  Goal: Patient's chronic conditions and co-morbidity symptoms are monitored and maintained or improved  Outcome: Progressing

## 2023-03-09 NOTE — PROGRESS NOTES
Patient: Rashmi Ramirez MRN: [id number]  Date of  Admission: 3/7/2023   YOB: 1955  Age: 69 y.o. Sex: male    Unit: [facility] C4 U  Room/Bed: 0426/0426-01 Admitting Physician: Roberto MENDEZ    Attending Physician:  Minerva Moulton MD         Pulmonary Service Note      SUBJECTIVE:    Pt nervous for this morning. Discussed procedure with patient. He c/o back pain. OBJECTIVE    Medications    Continuous Infusions:   sodium chloride         Scheduled Meds:   sodium chloride flush  5-40 mL IntraVENous 2 times per day    pantoprazole  40 mg Oral QAM AC    melatonin  5 mg Oral Nightly       PRN Meds:  sodium chloride flush, sodium chloride, ondansetron **OR** ondansetron, polyethylene glycol, acetaminophen **OR** acetaminophen, HYDROmorphone **OR** HYDROmorphone, labetalol    Physical    Patient Vitals for the past 24 hrs:   BP Temp Temp src Pulse Resp SpO2 Weight   03/09/23 0627 -- -- -- -- -- -- 186 lb 3.2 oz (84.5 kg)   03/09/23 0337 -- -- -- -- 16 -- --   03/09/23 0310 121/77 98 °F (36.7 °C) Oral 74 14 94 % --   03/08/23 2328 126/78 98.2 °F (36.8 °C) Oral 97 16 95 % --   03/08/23 2000 (!) 148/86 98.6 °F (37 °C) Oral 89 16 96 % --   03/08/23 1445 123/77 98.2 °F (36.8 °C) Oral 96 20 95 % --   03/08/23 1136 (!) 145/82 98.1 °F (36.7 °C) Oral 93 20 95 % --   03/08/23 0752 132/86 98.1 °F (36.7 °C) Oral 90 20 96 % --        Physical Exam  Constitutional:       General: He is not in acute distress. Appearance: He is not toxic-appearing. HENT:      Head: Normocephalic and atraumatic. Nose: Nose normal.      Mouth/Throat:      Pharynx: No oropharyngeal exudate. Eyes:      General: No scleral icterus. Right eye: No discharge. Left eye: No discharge. Cardiovascular:      Rate and Rhythm: Normal rate and regular rhythm. Heart sounds: No murmur heard. No friction rub. No gallop. Pulmonary:      Effort: Pulmonary effort is normal. No respiratory distress.       Breath sounds: No wheezing or rales. Abdominal:      General: Abdomen is flat. Bowel sounds are normal.      Palpations: Abdomen is soft. Musculoskeletal:         General: Normal range of motion. Cervical back: Normal range of motion. Skin:     General: Skin is warm and dry. Neurological:      General: No focal deficit present. Mental Status: He is alert and oriented to person, place, and time. Psychiatric:         Mood and Affect: Mood normal.            Weight  Weight change: 16 lb 3.2 oz (7.348 kg)      Labs:   Recent Labs     03/07/23 2316 03/08/23  0450   WBC 15.9* 14.2*   HGB 13.6 12.4*   HCT 40.8 37.0*    192      Recent Labs     03/07/23 2316 03/08/23  0450   * 128*   K 4.3 4.2   CL 95* 94*   CO2 21 23   BUN 22* 24*   GLUCOSE 107* 118*       Additional labs      Radiology, images personally reviewed. Chest x-ray 3/7/2023  Interstitial changes at the bases bilaterally. Costophrenic angles are sharp. Increase in vascular congestion. Left upper lobe opacity. CT chest without contrast 3/8/2023  Paratracheal adenopathy, mediastinal adenopathy. No pericardial or pleural effusion. Paraseptal and centrilobular emphysema. Left upper lobe nodule suspicious for cancer. Right lower lobe mass with posterior segment airway going into it. Atelectasis at the bases. Assessment/Plan   69y.o. year old male with significant past medical history of tobacco dependence that presents with right-sided chest pain. Assessment:  Right lower lobe lung mass  Left upper lobe nodule  Tobacco dependence  Centrilobular and paraseptal emphysema  Paratracheal and mediastinal adenopathy  Atelectasis     Plan:  - NPO for EBUS/bronchoscopy today. Pt agreed to consent this morning after discussing the procedure, risks, and benefits.  Planned for 11:00 AM.  - Hold DVT ppx  - Encouraged continue smoking cessation    Bossman Herndon MD    [facility] Pulmonary, Critical Care and Sleep Medicine  [phone]      Please note that some or all of this record was generated using voice recognition software. If there are any questions about the content of this document, please contact the author as some errors in transcription may have occurred.

## 2023-03-09 NOTE — OP NOTE
Operative Note      Patient: Mary Franco  YOB: 1955  MRN: [id number]    Date of Procedure: 3/9/2023    Pre-Op Diagnosis: Lung mass    Post-Op Diagnosis: Same       Procedure(s):  BRONCHOSCOPY ENDOBRONCHIAL ULTRASOUND  BRONCHOSCOPY/TRANSBRONCHIAL NEEDLE BIOPSY  BRONCHOSCOPY/TRANSBRONCHIAL NEEDLE BIOPSY ADDL LOBE  BRONCHOSCOPY BRUSHINGS  BRONCHOSCOPY ALVEOLAR LAVAGE    Surgeon(s):  Carmenza Corw MD    Assistant:   * No surgical staff found *    Anesthesia: General    Estimated Blood Loss (mL): Minimal    Complications: None    Specimens:   - Station 7 - 4 passes  - Station 11 R - 4 passes  - BAL of superior segment RLL  - Brushings at superior segment RLL    Implants:  * No implants in log *      Drains: * No LDAs found *    Findings:   -No endobronchial lesions  - Normal anatomy and mucosa throughout tracheobronchial tree  -4 passes at station 7  -4 passes at station 11 R  -3 brushings at superior segment of right lower lobe  -BAL from superior segment of right lower lobe    Detailed Description of Procedure:   Informed consent was obtained from the patient after explaining the risks and benefits. A time out was taken. General or total intravenous anesthesia was kindly provided by the anesthetist.     The scope was passed with ease via the ETT. Direct visualization of the lymph nodes was obtained using endobronchial ultrasound (EBUS) guidance. A complete ultrasound lymph node exam was performed for lymph node stations 4, 7, 10 and 11. The following lymph nodes were subjected to EBUS guided biopsy using standard technique and in the following order:    1. Station 7   2. Station 11 R     Subsequently, a standard bronchoscope was used to perform a complete airway inspection. Stations 4L, 10 L, and 11 L were not amendable to biopsies. Station 4R had difficulty obtaining biopsy likely due to calcification of the airway.     1.  Brushings were obtained in the region superior segment RLL under fluoroscopy   2. A bronchoalveolar lavage was obtained from the superior segment of RLL    The patient tolerated the procedure well. EBL< 50 cc's.  Recovery will be per the anesthesia team.      Electronically signed by Nancy Rosa MD on 3/9/2023 at 3:03 PM

## 2023-03-09 NOTE — PROGRESS NOTES
Awake and alert with no complaints. Occasional productive cough of cloudy mucous. Respirations easy. Meets criteria for discharge from PACU phase 2.

## 2023-03-10 ENCOUNTER — APPOINTMENT (OUTPATIENT)
Dept: CT IMAGING | Age: 68
End: 2023-03-10
Payer: MEDICARE

## 2023-03-10 VITALS
HEART RATE: 83 BPM | TEMPERATURE: 97.8 F | BODY MASS INDEX: 27.26 KG/M2 | SYSTOLIC BLOOD PRESSURE: 137 MMHG | WEIGHT: 184.01 LBS | RESPIRATION RATE: 18 BRPM | HEIGHT: 69 IN | OXYGEN SATURATION: 96 % | DIASTOLIC BLOOD PRESSURE: 79 MMHG

## 2023-03-10 LAB
ANION GAP SERPL CALCULATED.3IONS-SCNC: 10 MMOL/L (ref 3–16)
BUN BLDV-MCNC: 25 MG/DL (ref 7–20)
CALCIUM SERPL-MCNC: 9.1 MG/DL (ref 8.3–10.6)
CHLORIDE BLD-SCNC: 101 MMOL/L (ref 99–110)
CO2: 26 MMOL/L (ref 21–32)
CREAT SERPL-MCNC: 1.1 MG/DL (ref 0.8–1.3)
GFR SERPL CREATININE-BSD FRML MDRD: >60 ML/MIN/{1.73_M2}
GLUCOSE BLD-MCNC: 104 MG/DL (ref 70–99)
HCT VFR BLD CALC: 34.1 % (ref 40.5–52.5)
HEMOGLOBIN: 11.4 G/DL (ref 13.5–17.5)
MCH RBC QN AUTO: 30.3 PG (ref 26–34)
MCHC RBC AUTO-ENTMCNC: 33.4 G/DL (ref 31–36)
MCV RBC AUTO: 90.9 FL (ref 80–100)
PDW BLD-RTO: 13.1 % (ref 12.4–15.4)
PLATELET # BLD: 239 K/UL (ref 135–450)
PMV BLD AUTO: 7.8 FL (ref 5–10.5)
POTASSIUM SERPL-SCNC: 4.8 MMOL/L (ref 3.5–5.1)
RBC # BLD: 3.76 M/UL (ref 4.2–5.9)
SODIUM BLD-SCNC: 137 MMOL/L (ref 136–145)
WBC # BLD: 13.4 K/UL (ref 4–11)

## 2023-03-10 PROCEDURE — 97530 THERAPEUTIC ACTIVITIES: CPT

## 2023-03-10 PROCEDURE — 85027 COMPLETE CBC AUTOMATED: CPT

## 2023-03-10 PROCEDURE — 99233 SBSQ HOSP IP/OBS HIGH 50: CPT | Performed by: STUDENT IN AN ORGANIZED HEALTH CARE EDUCATION/TRAINING PROGRAM

## 2023-03-10 PROCEDURE — 6370000000 HC RX 637 (ALT 250 FOR IP): Performed by: NURSE PRACTITIONER

## 2023-03-10 PROCEDURE — 2580000003 HC RX 258: Performed by: NURSE PRACTITIONER

## 2023-03-10 PROCEDURE — 6360000002 HC RX W HCPCS: Performed by: NURSE PRACTITIONER

## 2023-03-10 PROCEDURE — 80048 BASIC METABOLIC PNL TOTAL CA: CPT

## 2023-03-10 PROCEDURE — 97161 PT EVAL LOW COMPLEX 20 MIN: CPT

## 2023-03-10 PROCEDURE — 36415 COLL VENOUS BLD VENIPUNCTURE: CPT

## 2023-03-10 PROCEDURE — 97165 OT EVAL LOW COMPLEX 30 MIN: CPT

## 2023-03-10 PROCEDURE — 6370000000 HC RX 637 (ALT 250 FOR IP): Performed by: INTERNAL MEDICINE

## 2023-03-10 RX ORDER — DOXYCYCLINE HYCLATE 100 MG
100 TABLET ORAL EVERY 12 HOURS SCHEDULED
Qty: 9 TABLET | Refills: 0 | Status: SHIPPED | OUTPATIENT
Start: 2023-03-10 | End: 2023-03-15

## 2023-03-10 RX ORDER — OXYCODONE HYDROCHLORIDE AND ACETAMINOPHEN 5; 325 MG/1; MG/1
1 TABLET ORAL EVERY 6 HOURS PRN
Qty: 12 TABLET | Refills: 0 | Status: SHIPPED | OUTPATIENT
Start: 2023-03-10 | End: 2023-03-13

## 2023-03-10 RX ORDER — DOXYCYCLINE HYCLATE 100 MG
100 TABLET ORAL EVERY 12 HOURS SCHEDULED
Status: DISCONTINUED | OUTPATIENT
Start: 2023-03-10 | End: 2023-03-10 | Stop reason: HOSPADM

## 2023-03-10 RX ADMIN — Medication 10 ML: at 04:30

## 2023-03-10 RX ADMIN — HYDROMORPHONE HYDROCHLORIDE 0.5 MG: 0.5 INJECTION, SOLUTION INTRAMUSCULAR; INTRAVENOUS; SUBCUTANEOUS at 04:30

## 2023-03-10 RX ADMIN — PANTOPRAZOLE SODIUM 40 MG: 40 TABLET, DELAYED RELEASE ORAL at 10:06

## 2023-03-10 RX ADMIN — DOXYCYCLINE HYCLATE 100 MG: 100 TABLET, COATED ORAL at 10:06

## 2023-03-10 RX ADMIN — SODIUM CHLORIDE, PRESERVATIVE FREE 10 ML: 5 INJECTION INTRAVENOUS at 10:06

## 2023-03-10 ASSESSMENT — PAIN SCALES - GENERAL
PAINLEVEL_OUTOF10: 8
PAINLEVEL_OUTOF10: 4
PAINLEVEL_OUTOF10: 0
PAINLEVEL_OUTOF10: 0

## 2023-03-10 ASSESSMENT — PAIN DESCRIPTION - ORIENTATION
ORIENTATION: MID;UPPER
ORIENTATION: MID;UPPER

## 2023-03-10 ASSESSMENT — PAIN DESCRIPTION - LOCATION
LOCATION: BACK
LOCATION: BACK

## 2023-03-10 ASSESSMENT — PAIN DESCRIPTION - FREQUENCY: FREQUENCY: INTERMITTENT

## 2023-03-10 ASSESSMENT — PAIN - FUNCTIONAL ASSESSMENT: PAIN_FUNCTIONAL_ASSESSMENT: ACTIVITIES ARE NOT PREVENTED

## 2023-03-10 ASSESSMENT — PAIN DESCRIPTION - PAIN TYPE: TYPE: ACUTE PAIN

## 2023-03-10 ASSESSMENT — PAIN DESCRIPTION - ONSET: ONSET: ON-GOING

## 2023-03-10 ASSESSMENT — PAIN DESCRIPTION - DESCRIPTORS
DESCRIPTORS: ACHING
DESCRIPTORS: ACHING

## 2023-03-10 NOTE — PROGRESS NOTES
Physical Therapy  Facility/Department: [facility] C4 PCU  Physical Therapy Initial Assessment/Treatment/Discharge     Name: Yanira Marcus  : 1955  MRN: [id number]  Date of Service: 3/10/2023    Discharge Recommendations:  Home independently   PT Equipment Recommendations  Equipment Needed: No      Patient Diagnosis(es): The primary encounter diagnosis was Widespread metastatic malignant neoplastic disease ([address] Diagnoses of Adrenal hemorrhage ([facility] [address] Mass of right lung, Infrarenal abdominal aortic aneurysm (AAA) without rupture, and Electrolyte disturbance were also pertinent to this visit. Past Medical History:  has a past medical history of Hypertension. Past Surgical History:  has a past surgical history that includes bronchoscopy (N/A, 3/9/2023); bronchoscopy (3/9/2023); bronchoscopy (3/9/2023); bronchoscopy (3/9/2023); and bronchoscopy (3/9/2023). Assessment   Assessment: Pt to [facility] with diagnosis of AAA without rupture. PTA pt lives in [facility] [facility] with sister, no COREY and able to live on the first floor. Pt was indepdnet with all transfers and ambulation with no AD. Pt currently presents at baseline function and is able to complete all mobility independently with no AD. Pt does not require skilled PT services at this time. Pt is safe to DC home independently when deemed medically appropriate. Seen as co-eval with OT in order to safely assess highest level of function and to optimize functional outcomes. Therapy Prognosis: Excellent  Decision Making: Low Complexity  Barriers to Learning: None  No Skilled PT: Independent with functional mobility   Requires PT Follow-Up: No  Activity Tolerance  Activity Tolerance: Patient tolerated evaluation without incident     Plan   Physcial Therapy Plan  General Plan: Discharge with evaluation only  Safety Devices  Type of Devices:  All michael prominences offloaded, Call light within reach, Gait belt, Left in bed Restrictions  Restrictions/Precautions  Restrictions/Precautions: NPO  Required Braces or Orthoses?: No  Position Activity Restriction  Other position/activity restrictions: tele     Subjective   Pain: Reports 2/10 back pain \"From laying in bed\"  General  Chart Reviewed: Yes  Patient assessed for rehabilitation services?: Yes  Response To Previous Treatment: Not applicable  Family / Caregiver Present: No  Referring Practitioner: Chana Contreras MD  Referral Date : 03/09/23  Diagnosis: AAA without rupture  Follows Commands: Within Functional Limits  General Comment  Comments: Pt in bed upon arriva, RN cleared for therpay  Subjective  Subjective: pt pleasant and agreeable to therapy eval         Social/Functional History  Social/Functional History  Lives With: Family (sister)  Type of Home: [facility]  Home Layout: Able to Live on Main level with bedroom/bathroom, Multi-level  Home Access: Level entry  Bathroom Shower/Tub: Walk-in shower, Shower chair with back  Bathroom Toilet: Standard  Bathroom Equipment: Grab bars in shower  Bathroom Accessibility: [facility]:  (no DME)  Has the patient had two or more falls in the past year or any fall with injury in the past year?: No  ADL Assistance: Independent  Homemaking Assistance: Independent  Homemaking Responsibilities: Yes  Ambulation Assistance: Independent  Transfer Assistance: Independent  Active : Yes  Mode of Transportation: Car  Occupation: Retired  Type of Occupation: \"you name it I probably done it\"    [address]: Impaired  Vision Exceptions: Wears glasses for reading  Hearing  Hearing: Within functional limits      Cognition   Orientation  Overall Orientation Status: Within Normal Limits  Orientation Level: Oriented X4  Cognition  Overall Cognitive Status: WFL     Objective   Heart Rate: 83  Heart Rate Source: Monitor  BP: 137/79  BP Location: Right upper arm  BP Method: Automatic  Patient Position: Supine  MAP (Calculated): 98  Resp: 18  SpO2: 96 %  O2 Device: None (Room air)     Observation/Palpation  Posture: Fair  Gross Assessment  AROM: Within functional limits  PROM: Within functional limits  Strength: Within functional limits     Bed Mobility Training  Bed Mobility Training: Yes  Overall Level of Assistance: Independent  Rolling: Independent  Supine to Sit: Independent  Sit to Supine: Independent  Scooting: Independent  Balance  Sitting: Intact  Standing: Intact  Transfer Training  Transfer Training: Yes  Overall Level of Assistance: Independent  Sit to Stand: Independent  Stand to Sit: Independent  Stand Pivot Transfers: Independent  Bed to Chair: Independent  Gait Training  Gait Training: Yes  Gait  Overall Level of Assistance: Independent  Gait Abnormalities:  (Gait grossly normal, at baseline per pt)  Distance (ft): 400 Feet  Assistive Device: Other (comment) (no device)     OutComes Score    AM-PAC Score  AM-PAC Inpatient Mobility Raw Score : 24 (03/10/23 0933)  AM-PAC Inpatient T-Scale Score : 61.14 (03/10/23 0933)  Mobility Inpatient CMS 0-100% Score: 0 (03/10/23 0933)  Mobility Inpatient CMS G-Code Modifier : [facility] (03/10/23 5714)       Goals  Short Term Goals  Time Frame for Short Term Goals: 3/10/23  Short Term Goal 1: pt will complete transfers independently -- MET 3/10/23  Short Term Goal 2: pt will ambulate 400ft independently with no AD -- MET 3/10/23  Additional Goals?: No  Patient Goals   Patient Goals :  \"To go home\"       Education  Patient Education  Education Given To: Patient  Education Provided: Role of Therapy;Plan of Care  Education Method: Verbal  Barriers to Learning: None  Education Outcome: Verbalized understanding;Demonstrated understanding      Therapy Time   Individual Concurrent Group Co-treatment   Time In 0852         Time Out 0910         Minutes 18         Timed Code Treatment Minutes: 8 Minutes (10 min eval)        David Valdez, PT, DPT    If pt is unable to be seen after this session, please let this note serve as discharge summary. Please see case management note for discharge disposition. Thank you.

## 2023-03-10 NOTE — PROGRESS NOTES
ONCOLOGY HEMATOLOGY CARE PROGRESS NOTE      SUBJECTIVE:  The patient states he is feeling much better. ROS:     Constitutional:  No weight loss, No fever, No chills, No night sweats. Energy level good.   Eyes:  No impairment or change in vision  ENT / Mouth:  No pain, abnormal ulceration, bleeding, nasal drip or change in voice or hearing  Cardiovascular:  No chest pain, palpitations, new edema, or calf discomfort  Respiratory:  No pain, hemoptysis, change to breathing  Breast:  No pain, discharge, change in appearance or texture  Gastrointestinal:  No pain, cramping, jaundice, change to eating and bowel habits  Urinary:  No pain, bleeding or change in continence  Genitalia: No pain, bleeding or discharge  Musculoskeletal:  No redness, pain, edema or weakness  Skin:  No pruritus, rash, change to nodules or lesions  Neurologic:  No discomfort, change in mental status, speech, sensory or motor activity  Psychiatric:  No change in concentration or change to affect or mood  Endocrine:  No hot flashes, increased thirst, or change to urine production  Hematologic: No petechiae, ecchymosis or bleeding  Lymphatic:  No lymphadenopathy or lymphedema  Allergy / Immunologic:  No eczema, hives, frequent or recurrent infections    OBJECTIVE        Physical    VITALS:  Patient Vitals for the past 24 hrs:   BP Temp Temp src Pulse Resp SpO2 Weight   03/10/23 0900 (!) 155/87 -- -- -- -- -- --   03/10/23 0755 (!) 155/84 97.6 °F (36.4 °C) Oral 80 18 94 % --   03/10/23 0428 (!) 143/81 97.8 °F (36.6 °C) Oral 86 18 92 % 184 lb 0.1 oz (83.5 kg)   03/09/23 2338 137/79 98.5 °F (36.9 °C) Oral 88 18 93 % --   03/09/23 2004 135/84 98 °F (36.7 °C) Oral 96 18 93 % --   03/09/23 1611 115/68 98.3 °F (36.8 °C) Oral (!) 110 18 92 % --   03/09/23 1405 131/72 -- -- 95 -- 90 % --   03/09/23 1345 105/74 -- -- 94 16 96 % --   03/09/23 1335 105/66 -- -- 90 16 94 % --   03/09/23 1320 110/62 -- -- 96 18 93 % -- 03/09/23 1305 111/68 -- -- 95 16 95 % --   03/09/23 1250 104/62 -- -- (!) 101 16 95 % --   03/09/23 1238 104/65 -- -- (!) 104 20 94 % --   03/09/23 1233 99/74 96.9 °F (36.1 °C) Temporal (!) 105 20 99 % --   03/09/23 1228 110/64 -- -- (!) 106 20 99 % --   03/09/23 1223 119/66 -- -- (!) 103 20 99 % --       24HR INTAKE/OUTPUT:    Intake/Output Summary (Last 24 hours) at 3/10/2023 5662  Last data filed at 3/9/2023 1813  Gross per 24 hour   Intake 1360 ml   Output --   Net 1360 ml       CONSTITUTIONAL: awake, alert, cooperative, no apparent distress   EYES: pupils equal, round and reactive to light, sclera clear and conjunctiva normal  ENT: Normocephalic, without obvious abnormality, atraumatic  NECK: supple, symmetrical, no jugular venous distension and no carotid bruits   HEMATOLOGIC/LYMPHATIC: no cervical, supraclavicular or axillary lymphadenopathy   LUNGS: no increased work of breathing and clear to auscultation   CARDIOVASCULAR: regular rate and rhythm, normal S1 and S2, no murmur noted  ABDOMEN: normal bowel sounds x 4, soft, non-distended, non-tender, no masses palpated, no hepatosplenomgaly   MUSCULOSKELETAL: full range of motion noted, tone is normal  NEUROLOGIC: awake, alert, oriented to name, place and time. Motor skills grossly intact. SKIN: Normal skin color, texture, turgor and no jaundice.  appears intact   EXTREMITIES: no LE edema     DATA:  CBC:    Recent Labs     03/10/23  0445 03/09/23  0834 03/08/23  0450 03/07/23  2316   WBC 13.4* 10.2 14.2* 15.9*   NEUTROABS  --   --  11.7* 14.5*   LYMPHOPCT  --   --  5.7 1.0   RBC 3.76* 3.87* 4.05* 4.50   HGB 11.4* 12.0* 12.4* 13.6   HCT 34.1* 35.4* 37.0* 40.8   MCV 90.9 91.4 91.2 90.7   MCH 30.3 31.0 30.6 30.3   MCHC 33.4 34.0 33.6 33.4   RDW 13.1 13.3 13.6 13.3    204 192 222       PT/INR:    Recent Labs     03/08/23  0450 03/08/23  0055 03/07/23  2316   PROT 6.2*  --  7.0   INR  --  1.24*  --      PTT:    Recent Labs     03/08/23 0055   APTT 28.7 CMP:    Recent Labs     03/10/23  0445 03/09/23  0834 03/08/23  0450 03/07/23  2316    134* 128* 130*   K 4.8 4.3 4.2 4.3    97* 94* 95*   CO2 26 24 23 21   GLUCOSE 104* 88 118* 107*   BUN 25* 24* 24* 22*   CREATININE 1.1 0.9 0.9 1.0   LABGLOM >60 >60 >60 >60   CALCIUM 9.1 9.0 8.7 9.4   PROT  --   --  6.2* 7.0   LABALBU  --   --  3.6 4.0   AGRATIO  --   --  1.4 1.3   BILITOT  --   --  1.5* 1.8*   ALKPHOS  --   --  96 117   ALT  --   --  11 12   AST  --   --  21 24       Lab Results   Component Value Date    CALCIUM 9.1 03/10/2023       LDH:No results for input(s): LDH in the last 720 hours. Radiology Review:  MRI BRAIN W WO CONTRAST  Narrative: EXAMINATION:  MRI OF THE BRAIN WITHOUT AND WITH CONTRAST  3/9/2023 4:41 pm    TECHNIQUE:  Multiplanar multisequence MRI of the head/brain was performed without and  with the administration of intravenous contrast.    COMPARISON:  None. HISTORY:  ORDERING SYSTEM PROVIDED HISTORY: lung mass with metastatic disease    FINDINGS:  INTRACRANIAL STRUCTURES/VENTRICLES: No evidence of an acute infarct or other  acute parenchymal process. No evidence of acute intracranial hemorrhage. There is no evidence of an intracranial mass or extraaxial fluid collection. No significant mass effect or midline shift. Few scattered foci of T2/FLAIR  hyperintensity are present within supratentorial white matter which is a  nonspecific finding but likely represents minimal chronic microvascular  ischemia. There is no significant volume loss. The ventricles are within  normal limits of size and configuration for age. The sellar/suprasellar  regions appear unremarkable. The normal signal voids within the major  intracranial vessels appear maintained. No evidence of abnormal parenchymal  or leptomeningeal enhancement. ORBITS: The visualized portion of the orbits demonstrate no acute abnormality. SINUSES: The mastoid air cells are well aerated.   Mild lobulated mucosal  thickening within floor of the maxillary sinuses, left greater than right. BONES/SOFT TISSUES: The bone marrow signal intensity appears normal. The soft  tissues demonstrate no acute abnormality. Impression: No evidence of brain metastasis. XR CHEST PORTABLE  Narrative: EXAMINATION:  ONE XRAY VIEW OF THE CHEST    3/9/2023 2:18 pm    COMPARISON:  March 7, 2023    HISTORY:  ORDERING SYSTEM PROVIDED HISTORY: s/p biopsy and brushings, rule out PTX  TECHNOLOGIST PROVIDED HISTORY:  Reason for exam:->s/p biopsy and brushings, rule out PTX  Reason for Exam: biopsy and brushings, R/O PTX    FINDINGS:  No pneumothorax noted. 1 cm nodule left lower lung. Right hilar adenopathy  suggested. Normal cardiac size. Moderate osteopenic changes and degenerative changes noted in the bony  structures. Impression: No pneumothorax. Left upper lobe lung nodule. Right hilar adenopathy. FLUORO FOR SURGICAL PROCEDURES  Narrative: EXAMINATION:  SPOT FLUOROSCOPIC IMAGES    3/9/2023 12:18 pm    TECHNIQUE:  Fluoroscopy was provided by the radiology department for procedure. Radiologist was not present during examination. FLUOROSCOPY DOSE AND TYPE:    Air Kerma: 9.33 mGy    COMPARISON:  None    HISTORY:  ORDERING SYSTEM PROVIDED HISTORY: EBUS  TECHNOLOGIST PROVIDED HISTORY:  Reason for exam:->EBUS    Intraprocedural imaging. FINDINGS:  3 images from a bronchoscopy. Bronchoscope is seen presumably in the region  of the right mainstem bronchus. Mild prominence of the hilar region noted. Impression: Intraprocedural fluoroscopic spot images as above. See separate procedure  report for more information. XR CHEST PA LATERAL W FLUOROSCOPY  Narrative: EXAMINATION:  SPOT FLUOROSCOPIC IMAGES    3/9/2023 12:18 pm    TECHNIQUE:  Fluoroscopy was provided by the radiology department for procedure. Radiologist was not present during examination.     FLUOROSCOPY DOSE AND TYPE:    Air Kerma: 9.33 mGy    COMPARISON:  None    HISTORY:  ORDERING SYSTEM PROVIDED HISTORY: EBUS  TECHNOLOGIST PROVIDED HISTORY:  Reason for exam:->EBUS    Intraprocedural imaging. FINDINGS:  3 images from a bronchoscopy. Bronchoscope is seen presumably in the region  of the right mainstem bronchus. Mild prominence of the hilar region noted. Impression: Intraprocedural fluoroscopic spot images as above. See separate procedure  report for more information.       Problem List  Patient Active Problem List   Diagnosis    Infrarenal abdominal aortic aneurysm (AAA) without rupture    Mass of right lung    Right adrenal mass (HCC)    Nontraumatic retroperitoneal hematoma    Hyponatremia    Mediastinal adenopathy    Current smoker    Leukocytosis    Centrilobular emphysema (HCC)    Widespread metastatic malignant neoplastic disease ([address]    Adrenal hemorrhage (HCC)       ASSESSMENT AND PLAN:    Lung mass:  -Status post bronchoscopy and endoscopic ultrasound  -Multiple liver masses and adrenal mass  -Right hilar and paratracheal adenopathy and mediastinal adenopathy  -Probable bone mets  -No treatment until we have a cell type  -PET scan will be needed as an outpatient  -Caris testing or NexGen sequencing cannot be ordered until he is an outpatient  -MRI of the brain was negative  -I discussed the above with the patient and the fact that he would likely need chemotherapy +/- immunotherapy  -Final course of action once his pathology and complete staging has been confirmed          ONCOLOGIC DISPOSITION:    -We will make arrangements for him to follow-up in the office    Kandis Doty MD  Please contact through [address]

## 2023-03-10 NOTE — PROGRESS NOTES
Patient: Rico Carrillo MRN: [id number]  Date of  Admission: 3/7/2023   YOB: 1955  Age: 69 y.o. Sex: male    [address]  Room/Bed: 0426/0426-01 Admitting Physician: Jay Jay MENDEZ    Attending Physician:  Jignesh Acosta MD         Pulmonary Service Note      SUBJECTIVE:    Pt with no complaints this morning. He denies any hemoptysis, chest pain, N/V, diarrhea, abdominal pain. OBJECTIVE    Medications    Continuous Infusions:   lactated ringers IV soln      sodium chloride         Scheduled Meds:   sodium chloride flush  5-40 mL IntraVENous 2 times per day    pantoprazole  40 mg Oral QAM AC    melatonin  5 mg Oral Nightly       PRN Meds:  zolpidem, sodium chloride flush, sodium chloride, ondansetron **OR** ondansetron, polyethylene glycol, acetaminophen **OR** acetaminophen, HYDROmorphone **OR** HYDROmorphone, labetalol    Physical    Patient Vitals for the past 24 hrs:   BP Temp Temp src Pulse Resp SpO2 Weight   03/10/23 0755 (!) 155/84 97.6 °F (36.4 °C) Oral 80 18 94 % --   03/10/23 0428 (!) 143/81 97.8 °F (36.6 °C) Oral 86 18 92 % 184 lb 0.1 oz (83.5 kg)   03/09/23 2338 137/79 98.5 °F (36.9 °C) Oral 88 18 93 % --   03/09/23 2004 135/84 98 °F (36.7 °C) Oral 96 18 93 % --   03/09/23 1611 115/68 98.3 °F (36.8 °C) Oral (!) 110 18 92 % --   03/09/23 1405 131/72 -- -- 95 -- 90 % --   03/09/23 1345 105/74 -- -- 94 16 96 % --   03/09/23 1335 105/66 -- -- 90 16 94 % --   03/09/23 1320 110/62 -- -- 96 18 93 % --   03/09/23 1305 111/68 -- -- 95 16 95 % --   03/09/23 1250 104/62 -- -- (!) 101 16 95 % --   03/09/23 1238 104/65 -- -- (!) 104 20 94 % --   03/09/23 1233 99/74 96.9 °F (36.1 °C) Temporal (!) 105 20 99 % --   03/09/23 1228 110/64 -- -- (!) 106 20 99 % --   03/09/23 1223 119/66 -- -- (!) 103 20 99 % --   03/09/23 0824 (!) 148/85 97.8 °F (36.6 °C) Oral 88 16 96 % --        Physical Exam  Constitutional:       General: He is not in acute distress.      Appearance: He is not toxic-appearing. HENT:      Head: Normocephalic and atraumatic. Nose: Nose normal.      Mouth/Throat:      Pharynx: No oropharyngeal exudate. Eyes:      General: No scleral icterus. Right eye: No discharge. Left eye: No discharge. Cardiovascular:      Rate and Rhythm: Normal rate and regular rhythm. Heart sounds: No murmur heard. No friction rub. No gallop. Pulmonary:      Effort: Pulmonary effort is normal. No respiratory distress. Breath sounds: Wheezing present. No rales. Abdominal:      General: Abdomen is flat. Bowel sounds are normal.      Palpations: Abdomen is soft. Musculoskeletal:         General: Normal range of motion. Cervical back: Normal range of motion. Skin:     General: Skin is warm and dry. Neurological:      General: No focal deficit present. Mental Status: He is alert and oriented to person, place, and time. Psychiatric:         Mood and Affect: Mood normal.            Weight  Weight change: -2 lb 3.1 oz (-0.995 kg)      Labs:   Recent Labs     03/08/23  0450 03/09/23  0834 03/10/23  0445   WBC 14.2* 10.2 13.4*   HGB 12.4* 12.0* 11.4*   HCT 37.0* 35.4* 34.1*    204 239      Recent Labs     03/08/23  0450 03/09/23  0834 03/10/23  0445   * 134* 137   K 4.2 4.3 4.8   CL 94* 97* 101   CO2 23 24 26   BUN 24* 24* 25*   GLUCOSE 118* 88 104*       Additional labs      Radiology, images personally reviewed. Chest x-ray 3/7/2023  Interstitial changes at the bases bilaterally. Costophrenic angles are sharp. Increase in vascular congestion. Left upper lobe opacity. CT chest without contrast 3/8/2023  Paratracheal adenopathy, mediastinal adenopathy. No pericardial or pleural effusion. Paraseptal and centrilobular emphysema. Left upper lobe nodule suspicious for cancer. Right lower lobe mass with posterior segment airway going into it. Atelectasis at the bases.     Assessment/Plan   69y.o. year old male with significant past medical history of tobacco dependence that presents with right-sided chest pain. Assessment:  Right lower lobe lung mass  Left upper lobe nodule  PNA  Tobacco dependence  Centrilobular and paraseptal emphysema  Paratracheal and mediastinal adenopathy  Atelectasis     Plan:  - Pathologist discussed preliminary results this morning after staining that are positive for malignancy. I will contact the patient when the final report is signed. - Will need Oncology f/u outpatient  - He had purulent secretions in airway that were suctioned, has a white count, recommend Doxycycline 100mg PO BID x 5 days  - Encouraged continue smoking cessation    MD Ned Juarez Pulmonary, Critical Care and Sleep Medicine  [phone]      Please note that some or all of this record was generated using voice recognition software. If there are any questions about the content of this document, please contact the author as some errors in transcription may have occurred.

## 2023-03-10 NOTE — CARE COORDINATION
CASE MANAGEMENT DISCHARGE SUMMARY      Discharge to: Home with sister        IMM given: (date) Today    New Durable Medical Equipment ordered/agency: None    Transportation:    Family/car: On arrival    Confirmed discharge plan with:MD/RN     Patient: yes     Family:  Patient alert and oriented and notified his family        RN, name: Andreas Butt

## 2023-03-10 NOTE — PROGRESS NOTES
Hospitalist Progress Note      PCP: No primary care provider on file. Date of Admission: 3/7/2023    Chief Complaint: Abdominal pain    Hospital Course: H&P reviewed  HPI:Anuj Julien is a 49-year-old male with no known significant past medical history aside from noting he was told he has had high blood pressure in the past without any treatment who presents to [facility] emergency department with complaint of right upper quadrant pain since this past Monday. He also notes he has been having shortness of breath and coughing for quite some time but most noticeable within the last month as well. The pain is isolated to the right upper quadrant, does not radiate, describes as a sharp stabbing pain, average 8 out of 10 without any alleviating or propagating factors. His evaluation in the emergency department included laboratory studies, CT of the abdomen pelvis with IV contrast, and CT chest without contrast.  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 Given the hematoma, vascular surgery was consulted by the ED for possible bleed, vascular surgery did not feel the hematoma was secondary to AAA; however, they do would like to see the patient in morning. Additionally, CT of the chest showed multiple pulmonary nodules with a dominant mass in the right lower lobe consistent with metastatic disease with notable left scapular erosion. Pertinent laboratory studies show sodium 130, BUN 22, mildly elevated bilirubin at 1.8, and a WBC of 15.9. Patient received multiple rounds of IV pain medication in the emergency department. Hospital team was consulted to admit this patient for infrarenal AAA and right adrenal hemorrhage with corresponding hematoma.       Subjective: S/p bronch and EBUS today. Patient satting on room air and denies any overt shortness of breath or belly pain today. Medications:  Reviewed    Infusion Medications    lactated ringers IV soln      sodium chloride       Scheduled Medications    sodium chloride flush  5-40 mL IntraVENous 2 times per day    pantoprazole  40 mg Oral QAM AC    melatonin  5 mg Oral Nightly     PRN Meds: zolpidem, sodium chloride flush, sodium chloride, ondansetron **OR** ondansetron, polyethylene glycol, acetaminophen **OR** acetaminophen, HYDROmorphone **OR** HYDROmorphone, labetalol      Intake/Output Summary (Last 24 hours) at 3/10/2023 3292  Last data filed at 3/9/2023 1813  Gross per 24 hour   Intake 1360 ml   Output --   Net 1360 ml         Physical Exam Performed:    BP (!) 155/87   Pulse 80   Temp 97.6 °F (36.4 °C) (Oral)   Resp 18   Ht 5' 9\" (1.753 m)   Wt 184 lb 0.1 oz (83.5 kg)   SpO2 94%   BMI 27.17 kg/m²     General appearance: No apparent distress, appears stated age and cooperative. HEENT: Pupils equal, round,Conjunctivae/corneas clear. Neck: Supple, with full range of motion. No jugular venous distention. Trachea midline. Respiratory:  Normal respiratory effort. Clear to auscultation, bilaterally without Rales/Wheezes/Rhonchi. Cardiovascular: Regular rate and rhythm with normal S1/S2 without murmurs, rubs or gallops. Abdomen: Soft, non-tender, non-distended with normal bowel sounds. Musculoskeletal: No clubbing, cyanosis or edema bilaterally.     Skin: Warm and dry  Neurologic: Alert, speech clear with no overt facial droop  Psychiatric: Alert and oriented, thought content appropriate, normal insight      Labs:   Recent Labs     03/08/23  0450 03/09/23  0834 03/10/23  0445   WBC 14.2* 10.2 13.4*   HGB 12.4* 12.0* 11.4*   HCT 37.0* 35.4* 34.1*    204 239       Recent Labs     03/08/23  0450 03/09/23  0834 03/10/23  0445   * 134* 137   K 4.2 4.3 4.8   CL 94* 97* 101   CO2 23 24 26   BUN 24* 24* 25*   CREATININE 0.9 0.9 1.1   CALCIUM 8.7 9.0 9.1       Recent Labs     03/07/23  2316 03/08/23  0450   AST 24 21   ALT 12 11   BILITOT 1.8* 1.5*   ALKPHOS 117 96       Recent Labs     03/08/23  0055   INR 1.24*       No results for input(s): Maliha Ryan in the last 72 hours. Urinalysis:      Lab Results   Component Value Date/Time    NITRU Negative 03/08/2023 01:29 AM    WBCUA 3-5 03/08/2023 01:29 AM    BACTERIA Rare 03/08/2023 01:29 AM    RBCUA 3-4 03/08/2023 01:29 AM    BLOODU MODERATE 03/08/2023 01:29 AM    SPECGRAV 1.010 03/08/2023 01:29 AM    GLUCOSEU Negative 03/08/2023 01:29 AM       Radiology:  MRI BRAIN W WO CONTRAST   Final Result   No evidence of brain metastasis. XR CHEST PORTABLE   Final Result   No pneumothorax. Left upper lobe lung nodule. Right hilar adenopathy. XR CHEST PA LATERAL W FLUOROSCOPY   Final Result   Intraprocedural fluoroscopic spot images as above. See separate procedure   report for more information. FLUORO FOR SURGICAL PROCEDURES   Final Result   Intraprocedural fluoroscopic spot images as above. See separate procedure   report for more information. CT Chest W/O Contrast   Final Result   1. Few bilateral pulmonary nodules including a dominant mass in the right   lower lobe compatible with malignancy with metastatic disease. 2. Mediastinal and right hilar lymphadenopathy. 3. Erosions of the left scapula compatible with osseous metastatic disease. Osteomyelitis could appear similar, but felt less likely given the additional   findings. 4. See same day CT abdomen regarding the upper abdominal findings. 5. Normal caliber thoracic aorta. 6. Mild emphysema. CT ABDOMEN PELVIS W IV CONTRAST Additional Contrast? None   Final Result   1. Hyperdense masses in the expected location of the right adrenal gland with   adjacent retroperitoneal hematoma.   Findings are most compatible with   hemorrhagic adrenal metastatic disease. 2. Multiple foci of hepatic metastatic disease. 3. Large infrarenal abdominal aortic aneurysm measuring up to 5.1 cm. There   is low attenuation along the wall of the aorta surrounding the lumen, most   compatible with intramural hematoma or thrombus. This is of undetermined   stability without a comparison exam.  Recommend vascular consultation. Findings discussed directly with Flakito Cueto at approximately 12:15 a.m.   on 03/08/2023. XR CHEST PORTABLE   Final Result   Mild nonspecific interstitial prominence in the right lung base could be   atelectasis or mild pneumonia. Indeterminate pulmonary nodule in the left upper lobe.       Recommend correlation with CT chest.         CT NEEDLE BIOPSY LUNG PERCUTANEOUS    (Results Pending)   CT GUIDED NEEDLE PLACEMENT    (Results Pending)       IP CONSULT TO VASCULAR SURGERY  IP CONSULT TO HOSPITALIST  IP CONSULT TO SPIRITUAL SERVICES  IP CONSULT TO ONCOLOGY  IP CONSULT TO PULMONOLOGY  IP CONSULT TO NEPHROLOGY  IP CONSULT TO INTERVENTIONAL RADIOLOGY    Assessment/Plan:    Active Hospital Problems    Diagnosis     Infrarenal abdominal aortic aneurysm (AAA) without rupture [I71.43]      Priority: Medium    Mass of right lung [R91.8]      Priority: Medium    Right adrenal mass ([address] [E27.8]      Priority: Medium    Nontraumatic retroperitoneal hematoma [K66.1]      Priority: Medium    Hyponatremia [E87.1]      Priority: Medium    Mediastinal adenopathy [R59.0]      Priority: Medium    Current smoker [F17.200]      Priority: Medium    Leukocytosis [D72.829]      Priority: Medium    Centrilobular emphysema (HCC) [J43.2]      Priority: Medium    Widespread metastatic malignant neoplastic disease ([address] [C80.0]      Priority: Medium    Adrenal hemorrhage (HCC) [E27.49]      Priority: Medium     RLL lung mass with suspected metastatic disease   -CT findings showed lung mass with mediastinal and paratracheal adenopathy, widespread metastatic disease of liver bone, lymph nodes and adrenal gland noted on imaging. Pulm and oncology consulted. S/p bronch with EBUS on 3/9. MRI brain to eval for metastatic disease pending. Infrarenal AAA: about 5.1cm with right adrenal mass and corresponding hemorrhage/hematoma concerning for bleed. 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 Nephro assisting. Held IVF. Na improving. Monitor      Leucocytosis: suspected due to malignancy. UA neg for UTI. No overt pneumonia on imaging. Procalcitonin was mildly elevated. Wbc normalized on lab today and hold off abx. Follow clinically and BAL cultures. DVT Prophylaxis: SCDs   Diet: ADULT DIET; Regular  Code Status: Full Code  PT/OT Eval Status: Not needed at this time as ambulatory    Dispo -hard to estimate LOS.   Pending work-up and specialist recs    Appropriate for A1 Discharge Unit: No      Shayna Marrero MD

## 2023-03-10 NOTE — DISCHARGE INSTR - DIET

## 2023-03-10 NOTE — PROGRESS NOTES
Pt discharged. After visit summary and medications reviewed with pt and pt sister at bedside. No further questions at this time. IV and Tele discontinued. All of belongings sent home with pt. Pt wheeled down in wheelchair by RN.

## 2023-03-10 NOTE — PROGRESS NOTES
Occupational Therapy  Facility/Department: [facility] C4 PCU  Occupational Therapy Initial Assessment/Discharge/1 visit only    Name: Carito Costa  : 1955  MRN: [id number]  Date of Service: 3/10/2023    Discharge Recommendations:  Home independently, Home with assist PRN  OT Equipment Recommendations  Equipment Needed: No       Patient Diagnosis(es): The primary encounter diagnosis was Widespread metastatic malignant neoplastic disease ([address] Diagnoses of Adrenal hemorrhage ([address] Mass of right lung, Infrarenal abdominal aortic aneurysm (AAA) without rupture, and Electrolyte disturbance were also pertinent to this visit. Past Medical History:  has a past medical history of Hypertension. Past Surgical History:  has no past surgical history on file. Assessment   Performance deficits / Impairments: Decreased endurance  Assessment: Pt is a 69 y.o. male \"presented to the hospital on 3/7/2023 with right upper quadrant pain for 3 days. Also shortness of breath and cough. He had a CT scan of the abdomen and pelvis done in the ED as well as CT chest.  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 Also AAA found. Vascular surgery was consulted in the ED for possible bleed. He also was found to have nodule in the right lower lobe consistent with metastatic disease with left scapular effusion. \" Prior to admission pt was independent with ADLs, IADLs, transfers/mobility, lives with sister, and drives. Pt is presenting at baseline status able to complete all mobility, transfers, and ADLs independently with fair balance/endurance. No needs for home DC. Recommend home with assist PRN and no DME needs. DC OT orders. Prognosis: Fair  Decision Making: Medium Complexity  No Skilled OT: Independent with ADL's;Independent with functional mobility; At baseline function; Safe to return home  REQUIRES OT FOLLOW-UP: No  Activity Tolerance  Activity Tolerance: Patient Tolerated treatment well        Plan   Occupational Therapy Plan  Times Per Week: 1 visit only, DC OT orders     Restrictions  Restrictions/Precautions  Restrictions/Precautions: NPO  Position Activity Restriction  Other position/activity restrictions: tele    Subjective   General  Chart Reviewed: Yes, History and Physical, Imaging, Labs, Orders, Progress Notes  Family / Caregiver Present: No  Referring Practitioner: Lennox Hook, MD  Diagnosis: AAA anuerysm, pulmonary nodules  Subjective  Subjective: Pt in bed, very pleasant. Per RN patient independent in room, possible lung biopsy this AM.  Pt agreeable to eval.  Reports 2/10 back pain \"From laying in bed\". Vitals WFL     Social/Functional History  Social/Functional History  Lives With: Family (sister)  [place] [facility] [place]  Home Layout: Able to Live on Main level with bedroom/bathroom, Multi-level  Home Access: Level entry  Bathroom Shower/Tub: Walk-in shower, Shower chair with back  Bathroom Toilet: Standard  Bathroom Equipment: Grab bars in shower  Bathroom Accessibility: Accessible  Home Equipment:  (no DME)  Has the patient had two or more falls in the past year or any fall with injury in the past year?: No  ADL Assistance: [address] [place] [address] Avenue: Independent  Homemaking Responsibilities: Yes  Ambulation Assistance: Independent  Transfer Assistance: Independent  Active : Yes  Mode of Transportation: Car  Occupation: Retired  Type of Occupation: \"you name it I probably done it\"       Objective   Heart Rate: 80  Heart Rate Source: Monitor  BP: (!) 155/87  BP Location: Right upper arm  BP Method: Automatic  Patient Position: Supine  MAP (Calculated): 110  Resp: 18  SpO2: 94 %  O2 Device: None (Room air)          Observation/Palpation  Posture: Fair  Safety Devices  Type of Devices:  All michael prominences offloaded;Call light within reach;Gait belt;Left in bed  Bed Mobility Training  Bed Mobility Training: Yes  Overall Level of Assistance: Independent  Rolling: Independent  Supine to Sit: Independent  Sit to Supine: Independent  Scooting: Independent  Balance  Sitting: Intact  Standing: Intact  Transfer Training  Transfer Training: Yes  Overall Level of Assistance: Independent  Sit to Stand: Independent  Stand to Sit: Independent  Stand Pivot Transfers: Independent  Bed to Chair: Independent  Gait  Overall Level of Assistance: Independent  Distance (ft): 400 Feet  Assistive Device: Other (comment) (no device)  Toilet Transfers  Toilet - Technique: Stand step  Equipment Used: Standard toilet  Toilet Transfer: Independent  AROM: Generally decreased, functional  PROM: Generally decreased, functional  Strength: Generally decreased, functional  Coordination: Generally decreased, functional  Tone: Normal  Sensation: Impaired (N/T PRN)  ADL  Feeding: NPO  Grooming: Independent  LE Dressing: Independent  Toileting: Independent        Bed mobility  Bridging: Independent  Rolling to Left: Independent  Rolling to Right: Independent  Supine to Sit: Independent  Sit to Supine: Independent  Scooting: Independent  Transfers  Stand Step Transfers: Independent  Stand Pivot Transfers: Independent  Sit to stand: Independent  Stand to sit: Independent  Vision - Basic Assessment  Prior Vision: Wears glasses only for reading  Visual History: No significant visual history  Patient Visual Report: No visual complaint reported. Visual Field Cut: No  Oculo Motor Control:  WNL  Vision  Vision: Impaired  Vision Exceptions: Wears glasses for reading  Hearing  Hearing: Within functional limits  Cognition  Overall Cognitive Status: WFL  Orientation  Overall Orientation Status: Within Normal Limits  Orientation Level: Oriented X4  Perception  Overall Perceptual Status: WFL               Education Given To: Patient  Education Provided: Role of Therapy;Plan of Care  Barriers to Learning: None  Education Outcome: Verbalized understanding  LUE AROM (degrees)  LUE AROM : WFL  Left Hand AROM (degrees)  Left Hand AROM: WFL  RUE AROM (degrees)  RUE AROM : WFL  Right Hand AROM (degrees)  Right Hand AROM: WFL        Hand Dominance  Hand Dominance: Right       AM-PAC Score        AM-PAC Inpatient Daily Activity Raw Score: 24 (03/10/23 0930)  AM-PAC Inpatient ADL T-Scale Score : 57.54 (03/10/23 0930)  ADL Inpatient CMS 0-100% Score: 0 (03/10/23 0930)  ADL Inpatient CMS G-Code Modifier : [address] (03/10/23 0930)    Goals  Short Term Goals  Time Frame for Short Term Goals: 1 visit only, no OT orders  Short Term Goal 1: Pt will complete functional mobility/ADLs independently with no device- GOAL MET 3/10  Patient Goals   Patient goals :  \"To go home\"       Therapy Time   Individual Concurrent Group Co-treatment   Time In 1162         Time Out 0910         Minutes 19         Timed Code Treatment Minutes: 9 Minutes (10 min eval)       Nithin Jimenez OTR/L

## 2023-03-10 NOTE — CARE COORDINATION
Patient with new cancer diagnosis. Lung mass, multiple liver masses and adrenal mass. Probable bone mets. Will need PET scan as outpatient. MRI of brain negative. Will need chemotherapy and immunotherapy. Final plan pending pathology and complete staging has been confirmed per oncology. No therapy need IPTA and from home with sister. Plans to return home with family support. Will follow for further recommendations and any barriers to discharge that may arise.

## 2023-03-10 NOTE — PROGRESS NOTES
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 male presented to   the hospital on 3/7/2023 with right upper quadrant pain for 3 days. Also shortness of breath and cough. He had a CT scan of the abdomen and pelvis done in the ED as well as CT chest.  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 Also AAA found. Vascular surgery was consulted in the ED for possible bleed. He also was found to have nodule in the right lower lobe consistent with metastatic disease with left scapular effusion. On admission his bilirubin was found to be high at 1.8, WBC high. Also low sodium. He is on IV fluids  He denies drinking alcohol  He is a smoker 45-pack-year smoking history.       ROS:     Seen with-multiple family members on 3/8/2023    positives in bold   Constitutional:  fever, chills, weakness, weight change, fatigue  Skin: rash, pruritus, hair loss, bruising, dry skin, petechiae  Head, Face, Neck   headaches, swelling,  cervical adenopathy  Respiratory: shortness of breath, cough, or wheezing  Cardiovascular: chest pain, palpitations, dizzy, edema  Gastrointestinal: nausea, vomiting, diarrhea, constipation,belly pain    Yellow skin, blood in stool  Musculoskeletal:  back pain, muscle weakness, gait problems,       joint pain or swelling. Genitourinary:  dysuria, poor urine flow, flank pain, blood in urine  Neurologic:  vertigo, TIA'S, syncope, seizures, focal weakness  Psychosocial:  insomnia, anxiety, or depression. Additional positive findings:                    All other remaining systems are negative or unable to obtain        PMH/PSH/SH/Family History:     Past Medical History:   Diagnosis Date    Hypertension        History reviewed. No pertinent surgical history. reports that he has quit smoking. His smoking use included cigarettes. He has never used smokeless tobacco. He reports that he does not drink alcohol and does not use drugs. family history is not on file.          Medication:     Current Facility-Administered Medications: doxycycline hyclate (VIBRA-TABS) tablet 100 mg, 100 mg, Oral, 2 times per day  zolpidem (AMBIEN) tablet 5 mg, 5 mg, Oral, Nightly PRN  sodium chloride flush 0.9 % injection 5-40 mL, 5-40 mL, IntraVENous, 2 times per day  sodium chloride flush 0.9 % injection 5-40 mL, 5-40 mL, IntraVENous, PRN  0.9 % sodium chloride infusion, , IntraVENous, PRN  ondansetron (ZOFRAN-ODT) disintegrating tablet 4 mg, 4 mg, Oral, Q8H PRN **OR** ondansetron (ZOFRAN) injection 4 mg, 4 mg, IntraVENous, Q6H PRN  polyethylene glycol (GLYCOLAX) packet 17 g, 17 g, Oral, Daily PRN  acetaminophen (TYLENOL) tablet 650 mg, 650 mg, Oral, Q6H PRN **OR** acetaminophen (TYLENOL) suppository 650 mg, 650 mg, Rectal, Q6H PRN  pantoprazole (PROTONIX) tablet 40 mg, 40 mg, Oral, QAM AC  HYDROmorphone (DILAUDID) injection 0.25 mg, 0.25 mg, IntraVENous, Q3H PRN **OR** HYDROmorphone (DILAUDID) injection 0.5 mg, 0.5 mg, IntraVENous, Q3H PRN  labetalol (NORMODYNE;TRANDATE) injection 10 mg, 10 mg, IntraVENous, Q4H PRN  melatonin disintegrating tablet 5 mg, 5 mg, Oral, Nightly       Vitals :     Vitals:    03/10/23 0900   BP: (!) 155/87   Pulse:    Resp:    Temp:    SpO2:        I & O :       Intake/Output Summary (Last 24 hours) at 3/10/2023 1101  Last data filed at 3/9/2023 1813  Gross per 24 hour   Intake 1360 ml   Output --   Net 1360 ml          Physical Examination :     General appearance: Alert, lethargic, lying flat,  Respiratory: NAD  Cardiovascular: NAD, Edema none  Abdomen: -Soft, nontender no distention  Other relevant findings:-       LABS:     Recent Labs     03/08/23  0450 03/09/23  0834 03/10/23  0445   WBC 14.2* 10.2 13.4*   HGB 12.4* 12.0* 11.4*   HCT 37.0* 35.4* 34.1*    204 239       Recent Labs     03/08/23  0450 03/09/23  0834 03/10/23  0445   * 134* 137   K 4.2 4.3 4.8   CL 94* 97* 101   CO2 23 24 26   BUN 24* 24* 25*   CREATININE 0.9 0.9 1.1   GLUCOSE 118* 88 104*              Thanks,   [facility]  [address] [place] [address]  Office: [phone]  Fax: [phone]

## 2023-03-11 NOTE — DISCHARGE SUMMARY
Hospital Medicine Discharge Summary    Patient ID: Royal Zimmer      Patient's PCP: No primary care provider on file. Admit Date: 3/7/2023     Discharge Date: 3/10/2023      Admitting Provider: Michael Tian DO     Discharge Provider: Felicity Albarado MD     Discharge Diagnoses: Active Hospital Problems    Diagnosis     Widespread metastatic malignant neoplastic disease ([facility] [C80.0]      Priority: High    Infrarenal abdominal aortic aneurysm (AAA) without rupture [I71.43]      Priority: Medium    Mass of right lung [R91.8]      Priority: Medium    Right adrenal mass ([facility]) [E27.8]      Priority: Medium    Nontraumatic retroperitoneal hematoma [K66.1]      Priority: Medium    Hyponatremia [E87.1]      Priority: Medium    Mediastinal adenopathy [R59.0]      Priority: Medium    Current smoker [F17.200]      Priority: Medium    Leukocytosis [D72.829]      Priority: Medium    Centrilobular emphysema ([facility] [J43.2]      Priority: Medium    Adrenal hemorrhage ([facility] [E27.49]      Priority: Medium       The patient was seen and examined on day of discharge and this discharge summary is in conjunction with any daily progress note from day of discharge. HPI :   Royal Zimmer is a 78-year-old male with no known significant past medical history aside from noting he was told he has had high blood pressure in the past without any treatment who presents to [facility] emergency department with complaint of right upper quadrant pain since this past Monday. He also notes he has been having shortness of breath and coughing for quite some time but most noticeable within the last month as well. The pain is isolated to the right upper quadrant, does not radiate, describes as a sharp stabbing pain, average 8 out of 10 without any alleviating or propagating factors.   His evaluation in the emergency department included laboratory studies, CT of the abdomen pelvis with IV contrast, and CT chest without contrast. 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 Given the hematoma, vascular surgery was consulted by the ED for possible bleed, vascular surgery did not feel the hematoma was secondary to AAA; however, they do would like to see the patient in morning. Additionally, CT of the chest showed multiple pulmonary nodules with a dominant mass in the right lower lobe consistent with metastatic disease with notable left scapular erosion. Pertinent laboratory studies show sodium 130, BUN 22, mildly elevated bilirubin at 1.8, and a WBC of 15.9. Patient received multiple rounds of IV pain medication in the emergency department. Hospital team was consulted to admit this patient for infrarenal AAA and right adrenal hemorrhage with corresponding hematoma. Hospital Course:     RLL lung mass with suspected metastatic disease   -CT findings showed lung mass with mediastinal and paratracheal adenopathy, widespread metastatic disease of liver bone, lymph nodes and adrenal gland noted on imaging. Pulm and oncology consulted. S/p bronch with EBUS on 3/9. Lymph node biopsies + non small cell   lung adenocarcinoma. MRI brain negative for metastasis. Outpatient f/u with pulm and oncology to determine treatment plan. Outpatient PET scan. Percocet PRN as needed for pain. Infrarenal AAA: about 5.1cm with right adrenal mass and corresponding hemorrhage/hematoma concerning for bleed. Vascular surgery following; no indication for urgent repair, recommended repair when >5.5cm depending on lide expectancy. Hyponatremia : likely SIADH due to malignancy. Improved with  fluid restriction. Continue fluid restriction of 2L/day. Leucocytosis: suspected due to malignancy with pneumonia. Procalcitonin was mildly elevated. Had purulent secretions in airway on  bronch. Discussed with pulm. Started on doxycyline 100mg BID. Physical Exam Performed:     /79   Pulse 83   Temp 97.8 °F (36.6 °C) (Oral)   Resp 18   Ht 5' 9\" (1.753 m)   Wt 184 lb 0.1 oz (83.5 kg)   SpO2 96%   BMI 27.17 kg/m²       General appearance:  No apparent distress, appears stated age and cooperative. HEENT:  Normal cephalic, atraumatic without obvious deformity. Pupils equal, round, and reactive to light. Conjunctivae/corneas clear. Neck: Supple, with full range of motion. No jugular venous distention. Trachea midline. Respiratory:  Normal respiratory effort. Clear to auscultation, bilaterally without Rales/Wheezes/Rhonchi. Cardiovascular:  Regular rate and rhythm with normal S1/S2 without murmurs, rubs or gallops. Abdomen: Soft, non-tender, non-distended with normal bowel sounds. Musculoskeletal:  no cyanosis or edema   Skin:  warm and dry   Neurologic:  alert, speech clear with no overt facial droop  Psychiatric:  Alert and oriented, thought content appropriate, normal insight        Labs: For convenience and continuity at follow-up the following most recent labs are provided:      CBC:    Lab Results   Component Value Date/Time    WBC 13.4 03/10/2023 04:45 AM    HGB 11.4 03/10/2023 04:45 AM    HCT 34.1 03/10/2023 04:45 AM     03/10/2023 04:45 AM       Renal:    Lab Results   Component Value Date/Time     03/10/2023 04:45 AM    K 4.8 03/10/2023 04:45 AM    K 4.2 03/08/2023 04:50 AM     03/10/2023 04:45 AM    CO2 26 03/10/2023 04:45 AM    BUN 25 03/10/2023 04:45 AM    CREATININE 1.1 03/10/2023 04:45 AM    CALCIUM 9.1 03/10/2023 04:45 AM         Significant Diagnostic Studies    Radiology:   MRI BRAIN W WO CONTRAST   Final Result   No evidence of brain metastasis. XR CHEST PORTABLE   Final Result   No pneumothorax. Left upper lobe lung nodule. Right hilar adenopathy. XR CHEST PA LATERAL W FLUOROSCOPY   Final Result   Intraprocedural fluoroscopic spot images as above. See separate procedure   report for more information. FLUORO FOR SURGICAL PROCEDURES   Final Result   Intraprocedural fluoroscopic spot images as above. See separate procedure   report for more information. CT Chest W/O Contrast   Final Result   1. Few bilateral pulmonary nodules including a dominant mass in the right   lower lobe compatible with malignancy with metastatic disease. 2. Mediastinal and right hilar lymphadenopathy. 3. Erosions of the left scapula compatible with osseous metastatic disease. Osteomyelitis could appear similar, but felt less likely given the additional   findings. 4. See same day CT abdomen regarding the upper abdominal findings. 5. Normal caliber thoracic aorta. 6. Mild emphysema. CT ABDOMEN PELVIS W IV CONTRAST Additional Contrast? None   Final Result   1. Hyperdense masses in the expected location of the right adrenal gland with   adjacent retroperitoneal hematoma. Findings are most compatible with   hemorrhagic adrenal metastatic disease. 2. Multiple foci of hepatic metastatic disease. 3. Large infrarenal abdominal aortic aneurysm measuring up to 5.1 cm. There   is low attenuation along the wall of the aorta surrounding the lumen, most   compatible with intramural hematoma or thrombus. This is of undetermined   stability without a comparison exam.  Recommend vascular consultation. Findings discussed directly with Kvng Doe at approximately 12:15 a.m.   on 03/08/2023. XR CHEST PORTABLE   Final Result   Mild nonspecific interstitial prominence in the right lung base could be   atelectasis or mild pneumonia. Indeterminate pulmonary nodule in the left upper lobe.       Recommend correlation with CT chest.         CT NEEDLE BIOPSY LUNG PERCUTANEOUS    (Results Pending)          Consults:     IP CONSULT TO VASCULAR SURGERY  IP CONSULT TO HOSPITALIST  IP CONSULT TO SPIRITUAL SERVICES  IP CONSULT TO ONCOLOGY  IP CONSULT TO PULMONOLOGY  IP CONSULT TO NEPHROLOGY  IP CONSULT TO INTERVENTIONAL RADIOLOGY    Disposition:  home      Condition at Discharge: Stable    Discharge Instructions/Follow-up:  F/u with oncology and pulmonary     Code Status:  Full code      Activity: activity as tolerated    Diet: regular diet      Discharge Medications:     Discharge Medication List as of 3/10/2023 11:34 AM             Details   doxycycline hyclate (VIBRA-TABS) 100 MG tablet Take 1 tablet by mouth every 12 hours for 5 days, Disp-9 tablet, R-0Normal      oxyCODONE-acetaminophen (PERCOCET) 5-325 MG per tablet Take 1 tablet by mouth every 6 hours as needed for Pain for up to 3 days. Intended supply: 3 days. Take lowest dose possible to manage pain Max Daily Amount: 4 tablets, Disp-12 tablet, R-0Print             Time Spent on discharge: 40 minutes  in the examination, evaluation, counseling and review of medications and discharge plan. Signed:    Patty Wang MD   3/10/2023      Thank you No primary care provider on file. for the opportunity to be involved in this patient's care. If you have any questions or concerns, please feel free to contact me at [phone].

## 2023-03-12 ENCOUNTER — TELEPHONE (OUTPATIENT)
Dept: PULMONOLOGY | Age: 68
End: 2023-03-12

## 2023-03-12 LAB
CULTURE, RESPIRATORY: ABNORMAL
CULTURE, RESPIRATORY: ABNORMAL
GRAM STAIN RESULT: ABNORMAL
ORGANISM: ABNORMAL

## 2023-03-13 ENCOUNTER — TELEPHONE (OUTPATIENT)
Dept: PULMONOLOGY | Age: 68
End: 2023-03-13

## 2023-03-13 DIAGNOSIS — C34.90 PRIMARY ADENOCARCINOMA OF LUNG, UNSPECIFIED LATERALITY (HCC): Primary | ICD-10-CM

## 2023-03-13 NOTE — TELEPHONE ENCOUNTER
Tried to call patient x 2 for results from EBUS on 3/9/23 but unable to get in contact with patient. Will try again tomorrow.     Pat Concepcion MD  [facility] Pulmonary Critical Care

## 2023-03-13 NOTE — TELEPHONE ENCOUNTER
Discussed with patient results from EBUS on 3/9/23 that were positive for adenocarcinoma of lung. Will make a referral to Oncology for treatment plan.     Manjit Shell MD  [facility] Pulmonary Critical Care

## 2023-03-20 ENCOUNTER — HOSPITAL ENCOUNTER (OUTPATIENT)
Dept: VASCULAR LAB | Age: 68
Discharge: HOME | End: 2023-03-20
Payer: MEDICARE

## 2023-03-20 DIAGNOSIS — R60.0 LEG EDEMA, LEFT: ICD-10-CM

## 2023-03-20 PROCEDURE — 93971 EXTREMITY STUDY: CPT

## 2023-03-23 ENCOUNTER — TELEPHONE (OUTPATIENT)
Dept: SURGERY | Age: 68
End: 2023-03-23

## 2023-03-23 NOTE — TELEPHONE ENCOUNTER
Called and spoke with pts sister Kristen Hamm - explained that pt cannot be on a blood thinner while having surgery and that either his Port Placement will need to be rescheduled or he will need to be taken off his blood thinner - Asked pts sister to call the prescribing doctor and ask if pt should continue taking and reschedule port placement or hold Eliquis x48 hrs prior to his surgery and resume afterward - Sister said she will call [facility] to find out and wcb w/ answer

## 2023-03-23 NOTE — TELEPHONE ENCOUNTER
Patient is scheduled for a port placement on 3/27/23 at 2:30pm and an arrival time of 12:30pm with Dr. Cait Radford, [facility] - NPO after midnight [place] before surgery - patient will need a  day of surgery - [facility] completed pre-op exam - Patient's sister Tung Beckman) understood and agreed with above noted.

## 2023-03-24 NOTE — TELEPHONE ENCOUNTER
Called pts Sister Wayne Du who just left from pts appt w/ [facility] - Pts clot is significant so doc wants pt to stay on blood thinner.  Port scheduled on Monday 3/27/23 w/ Dr Uriel Nair is cancelled - Sent MS Teams message to Vasu Gaona in PAT @ [facility] informing of above noted - Pts sister said she wcb to r/s [facility] after pt is finished w/ blood thinner and clot is disolved

## 2023-03-26 ENCOUNTER — APPOINTMENT (OUTPATIENT)
Dept: CT IMAGING | Age: 68
DRG: 176 | End: 2023-03-26
Payer: MEDICARE

## 2023-03-26 ENCOUNTER — HOSPITAL ENCOUNTER (INPATIENT)
Age: 68
LOS: 1 days | Discharge: HOSPICE | DRG: 176 | End: 2023-03-27
Attending: EMERGENCY MEDICINE | Admitting: INTERNAL MEDICINE
Payer: MEDICARE

## 2023-03-26 DIAGNOSIS — D64.9 ANEMIA, UNSPECIFIED TYPE: ICD-10-CM

## 2023-03-26 DIAGNOSIS — R58 INTRA ABDOMINAL HEMORRHAGE: ICD-10-CM

## 2023-03-26 DIAGNOSIS — R10.84 GENERALIZED ABDOMINAL PAIN: Primary | ICD-10-CM

## 2023-03-26 DIAGNOSIS — I26.99 PULMONARY EMBOLISM ON LEFT (HCC): ICD-10-CM

## 2023-03-26 DIAGNOSIS — Z79.01 ANTICOAGULATED: ICD-10-CM

## 2023-03-26 PROBLEM — C34.90 NSCLC METASTATIC TO ADRENAL GLAND (HCC): Status: ACTIVE | Noted: 2023-03-26

## 2023-03-26 PROBLEM — R10.9 ABDOMINAL PAIN: Status: ACTIVE | Noted: 2023-03-26

## 2023-03-26 PROBLEM — S37.812A: Status: ACTIVE | Noted: 2023-03-26

## 2023-03-26 PROBLEM — C79.70 NSCLC METASTATIC TO ADRENAL GLAND (HCC): Status: ACTIVE | Noted: 2023-03-26

## 2023-03-26 LAB
ABO + RH BLD: NORMAL
ALBUMIN SERPL-MCNC: 3 G/DL (ref 3.4–5)
ALBUMIN/GLOB SERPL: 0.8 {RATIO} (ref 1.1–2.2)
ALP SERPL-CCNC: 119 U/L (ref 40–129)
ALT SERPL-CCNC: 23 U/L (ref 10–40)
ANION GAP SERPL CALCULATED.3IONS-SCNC: 10 MMOL/L (ref 3–16)
AST SERPL-CCNC: 38 U/L (ref 15–37)
BASOPHILS # BLD: 0.1 K/UL (ref 0–0.2)
BASOPHILS NFR BLD: 0.5 %
BILIRUB SERPL-MCNC: 0.7 MG/DL (ref 0–1)
BLD GP AB SCN SERPL QL: NORMAL
BUN SERPL-MCNC: 15 MG/DL (ref 7–20)
CALCIUM SERPL-MCNC: 8.9 MG/DL (ref 8.3–10.6)
CHLORIDE SERPL-SCNC: 98 MMOL/L (ref 99–110)
CO2 SERPL-SCNC: 25 MMOL/L (ref 21–32)
CREAT SERPL-MCNC: 0.8 MG/DL (ref 0.8–1.3)
DEPRECATED RDW RBC AUTO: 13.5 % (ref 12.4–15.4)
EOSINOPHIL # BLD: 0.3 K/UL (ref 0–0.6)
EOSINOPHIL NFR BLD: 2.6 %
GFR SERPLBLD CREATININE-BSD FMLA CKD-EPI: >60 ML/MIN/{1.73_M2}
GLUCOSE SERPL-MCNC: 133 MG/DL (ref 70–99)
HCT VFR BLD AUTO: 32.7 % (ref 40.5–52.5)
HGB BLD-MCNC: 10.8 G/DL (ref 13.5–17.5)
INR PPP: 1.65 (ref 0.87–1.14)
LACTATE BLDV-SCNC: 1.3 MMOL/L (ref 0.4–1.9)
LIPASE SERPL-CCNC: 13 U/L (ref 13–60)
LYMPHOCYTES # BLD: 0.5 K/UL (ref 1–5.1)
LYMPHOCYTES NFR BLD: 3.9 %
MCH RBC QN AUTO: 29.7 PG (ref 26–34)
MCHC RBC AUTO-ENTMCNC: 32.9 G/DL (ref 31–36)
MCV RBC AUTO: 90.4 FL (ref 80–100)
MONOCYTES # BLD: 0.5 K/UL (ref 0–1.3)
MONOCYTES NFR BLD: 3.5 %
NEUTROPHILS # BLD: 11.9 K/UL (ref 1.7–7.7)
NEUTROPHILS NFR BLD: 89.5 %
NT-PROBNP SERPL-MCNC: 277 PG/ML (ref 0–124)
PLATELET # BLD AUTO: 446 K/UL (ref 135–450)
PMV BLD AUTO: 7.5 FL (ref 5–10.5)
POTASSIUM SERPL-SCNC: 5.6 MMOL/L (ref 3.5–5.1)
PROT SERPL-MCNC: 6.9 G/DL (ref 6.4–8.2)
PROTHROMBIN TIME: 19.5 SEC (ref 11.7–14.5)
RBC # BLD AUTO: 3.62 M/UL (ref 4.2–5.9)
SODIUM SERPL-SCNC: 133 MMOL/L (ref 136–145)
TROPONIN T SERPL-MCNC: <0.01 NG/ML
WBC # BLD AUTO: 13.2 K/UL (ref 4–11)

## 2023-03-26 PROCEDURE — 6360000002 HC RX W HCPCS: Performed by: INTERNAL MEDICINE

## 2023-03-26 PROCEDURE — 93005 ELECTROCARDIOGRAM TRACING: CPT | Performed by: EMERGENCY MEDICINE

## 2023-03-26 PROCEDURE — 80053 COMPREHEN METABOLIC PANEL: CPT

## 2023-03-26 PROCEDURE — 83690 ASSAY OF LIPASE: CPT

## 2023-03-26 PROCEDURE — 83605 ASSAY OF LACTIC ACID: CPT

## 2023-03-26 PROCEDURE — 74174 CTA ABD&PLVS W/CONTRAST: CPT

## 2023-03-26 PROCEDURE — 86901 BLOOD TYPING SEROLOGIC RH(D): CPT

## 2023-03-26 PROCEDURE — 6360000004 HC RX CONTRAST MEDICATION: Performed by: EMERGENCY MEDICINE

## 2023-03-26 PROCEDURE — 6360000002 HC RX W HCPCS: Performed by: PHYSICIAN ASSISTANT

## 2023-03-26 PROCEDURE — 2700000000 HC OXYGEN THERAPY PER DAY

## 2023-03-26 PROCEDURE — 86900 BLOOD TYPING SEROLOGIC ABO: CPT

## 2023-03-26 PROCEDURE — 1200000000 HC SEMI PRIVATE

## 2023-03-26 PROCEDURE — 94761 N-INVAS EAR/PLS OXIMETRY MLT: CPT

## 2023-03-26 PROCEDURE — 85025 COMPLETE CBC W/AUTO DIFF WBC: CPT

## 2023-03-26 PROCEDURE — 85610 PROTHROMBIN TIME: CPT

## 2023-03-26 PROCEDURE — 96375 TX/PRO/DX INJ NEW DRUG ADDON: CPT

## 2023-03-26 PROCEDURE — 84484 ASSAY OF TROPONIN QUANT: CPT

## 2023-03-26 PROCEDURE — 86850 RBC ANTIBODY SCREEN: CPT

## 2023-03-26 PROCEDURE — 99285 EMERGENCY DEPT VISIT HI MDM: CPT

## 2023-03-26 PROCEDURE — 83880 ASSAY OF NATRIURETIC PEPTIDE: CPT

## 2023-03-26 PROCEDURE — 96374 THER/PROPH/DIAG INJ IV PUSH: CPT

## 2023-03-26 PROCEDURE — 2580000003 HC RX 258: Performed by: PHYSICIAN ASSISTANT

## 2023-03-26 PROCEDURE — 6370000000 HC RX 637 (ALT 250 FOR IP): Performed by: INTERNAL MEDICINE

## 2023-03-26 RX ORDER — MORPHINE SULFATE 4 MG/ML
4 INJECTION, SOLUTION INTRAMUSCULAR; INTRAVENOUS
Status: DISCONTINUED | OUTPATIENT
Start: 2023-03-26 | End: 2023-03-27 | Stop reason: HOSPADM

## 2023-03-26 RX ORDER — OXYCODONE HYDROCHLORIDE 5 MG/1
10 TABLET ORAL EVERY 4 HOURS PRN
Status: DISCONTINUED | OUTPATIENT
Start: 2023-03-26 | End: 2023-03-27 | Stop reason: HOSPADM

## 2023-03-26 RX ORDER — ONDANSETRON 2 MG/ML
4 INJECTION INTRAMUSCULAR; INTRAVENOUS EVERY 6 HOURS PRN
Status: DISCONTINUED | OUTPATIENT
Start: 2023-03-26 | End: 2023-03-27 | Stop reason: HOSPADM

## 2023-03-26 RX ORDER — SODIUM CHLORIDE 9 MG/ML
INJECTION, SOLUTION INTRAVENOUS PRN
Status: DISCONTINUED | OUTPATIENT
Start: 2023-03-26 | End: 2023-03-27 | Stop reason: HOSPADM

## 2023-03-26 RX ORDER — SODIUM CHLORIDE 0.9 % (FLUSH) 0.9 %
5-40 SYRINGE (ML) INJECTION EVERY 12 HOURS SCHEDULED
Status: DISCONTINUED | OUTPATIENT
Start: 2023-03-26 | End: 2023-03-27 | Stop reason: HOSPADM

## 2023-03-26 RX ORDER — 0.9 % SODIUM CHLORIDE 0.9 %
1000 INTRAVENOUS SOLUTION INTRAVENOUS ONCE
Status: COMPLETED | OUTPATIENT
Start: 2023-03-26 | End: 2023-03-26

## 2023-03-26 RX ORDER — ONDANSETRON 2 MG/ML
4 INJECTION INTRAMUSCULAR; INTRAVENOUS ONCE
Status: COMPLETED | OUTPATIENT
Start: 2023-03-26 | End: 2023-03-26

## 2023-03-26 RX ORDER — ACETAMINOPHEN 325 MG/1
650 TABLET ORAL EVERY 6 HOURS PRN
Status: DISCONTINUED | OUTPATIENT
Start: 2023-03-26 | End: 2023-03-27 | Stop reason: HOSPADM

## 2023-03-26 RX ORDER — ONDANSETRON 4 MG/1
4 TABLET, ORALLY DISINTEGRATING ORAL EVERY 8 HOURS PRN
Status: DISCONTINUED | OUTPATIENT
Start: 2023-03-26 | End: 2023-03-27 | Stop reason: HOSPADM

## 2023-03-26 RX ORDER — SODIUM CHLORIDE 0.9 % (FLUSH) 0.9 %
5-40 SYRINGE (ML) INJECTION PRN
Status: DISCONTINUED | OUTPATIENT
Start: 2023-03-26 | End: 2023-03-27 | Stop reason: HOSPADM

## 2023-03-26 RX ORDER — ACETAMINOPHEN 650 MG/1
650 SUPPOSITORY RECTAL EVERY 6 HOURS PRN
Status: DISCONTINUED | OUTPATIENT
Start: 2023-03-26 | End: 2023-03-27 | Stop reason: HOSPADM

## 2023-03-26 RX ORDER — MORPHINE SULFATE 2 MG/ML
2 INJECTION, SOLUTION INTRAMUSCULAR; INTRAVENOUS
Status: DISCONTINUED | OUTPATIENT
Start: 2023-03-26 | End: 2023-03-27 | Stop reason: HOSPADM

## 2023-03-26 RX ORDER — MORPHINE SULFATE 4 MG/ML
4 INJECTION, SOLUTION INTRAMUSCULAR; INTRAVENOUS ONCE
Status: COMPLETED | OUTPATIENT
Start: 2023-03-26 | End: 2023-03-26

## 2023-03-26 RX ORDER — LORAZEPAM 1 MG/1
1 TABLET ORAL NIGHTLY PRN
Status: DISCONTINUED | OUTPATIENT
Start: 2023-03-26 | End: 2023-03-27 | Stop reason: HOSPADM

## 2023-03-26 RX ORDER — POLYETHYLENE GLYCOL 3350 17 G/17G
17 POWDER, FOR SOLUTION ORAL DAILY PRN
Status: DISCONTINUED | OUTPATIENT
Start: 2023-03-26 | End: 2023-03-27 | Stop reason: HOSPADM

## 2023-03-26 RX ADMIN — SODIUM CHLORIDE 1000 ML: 9 INJECTION, SOLUTION INTRAVENOUS at 19:24

## 2023-03-26 RX ADMIN — MORPHINE SULFATE 2 MG: 2 INJECTION, SOLUTION INTRAMUSCULAR; INTRAVENOUS at 23:52

## 2023-03-26 RX ADMIN — MORPHINE SULFATE 4 MG: 4 INJECTION, SOLUTION INTRAMUSCULAR; INTRAVENOUS at 19:25

## 2023-03-26 RX ADMIN — ONDANSETRON 4 MG: 2 INJECTION INTRAMUSCULAR; INTRAVENOUS at 19:25

## 2023-03-26 RX ADMIN — IOPAMIDOL 75 ML: 755 INJECTION, SOLUTION INTRAVENOUS at 19:00

## 2023-03-26 RX ADMIN — LORAZEPAM 1 MG: 1 TABLET ORAL at 23:42

## 2023-03-26 ASSESSMENT — PAIN DESCRIPTION - LOCATION: LOCATION: ABDOMEN

## 2023-03-26 ASSESSMENT — PAIN - FUNCTIONAL ASSESSMENT
PAIN_FUNCTIONAL_ASSESSMENT: PREVENTS OR INTERFERES SOME ACTIVE ACTIVITIES AND ADLS
PAIN_FUNCTIONAL_ASSESSMENT: NONE - DENIES PAIN

## 2023-03-26 ASSESSMENT — PAIN DESCRIPTION - PAIN TYPE: TYPE: ACUTE PAIN

## 2023-03-26 ASSESSMENT — PAIN DESCRIPTION - ORIENTATION: ORIENTATION: ANTERIOR;MID

## 2023-03-26 ASSESSMENT — PAIN DESCRIPTION - FREQUENCY: FREQUENCY: CONTINUOUS

## 2023-03-26 ASSESSMENT — PAIN SCALES - GENERAL
PAINLEVEL_OUTOF10: 7
PAINLEVEL_OUTOF10: 0

## 2023-03-26 ASSESSMENT — PAIN DESCRIPTION - ONSET: ONSET: ON-GOING

## 2023-03-26 ASSESSMENT — PAIN DESCRIPTION - DESCRIPTORS: DESCRIPTORS: SHARP;SHOOTING

## 2023-03-26 NOTE — ED PROVIDER NOTES
at this time. He will be admitted to hospitalist for further evaluation and treatment. CLINICAL IMPRESSION  1. Generalized abdominal pain    2. Intra abdominal hemorrhage    3. Pulmonary embolism on left ([address]    4. Anticoagulated    5. Anemia, unspecified type          I, Author Melina WILD, am the primary clinician of record. I personally saw the patient and independently provided 35 minutes of non-concurrent critical care out of the total shared critical care time provided. This chart was generated in part by using Dragon Dictation system and may contain errors related to that system including errors in grammar, punctuation, and spelling, as well as words and phrases that may be inappropriate. If there are any questions or concerns please feel free to contact the dictating provider for clarification.      Dewayne Self DO   [employer]       Dewayne Self DO  03/26/23 3530
icterus. PERRL. NECK: Supple. Normal ROM. CARDIOVASCULAR: Tachycardic with regular rhythm. Intact distal pulses. PULMONARY/CHEST WALL: Effort normal. No tachypnea. Lungs scattered wheezing and rhonchi to ausculation. ABDOMEN: Soft. Nondistended. Tenderness to palpate. No definitive palpable mass or pulsation. /ANORECTAL: Not assessed  MUSKULOSKELETAL: Normal ROM. No acute deformities. No edema. No tenderness to palpate. SKIN: Warm and dry. No rash. Poor coloring. NEUROLOGICAL: Alert and oriented x 3. GCS 15. CN II-XII grossly intact. Strength is 5/5 in all extremities and sensation is intact.     PSYCHIATRIC: Normal affect      DIAGNOSTIC RESULTS:     LABS:    Results for orders placed or performed during the hospital encounter of 03/26/23   CBC with Auto Differential   Result Value Ref Range    WBC 13.2 (H) 4.0 - 11.0 K/uL    RBC 3.62 (L) 4.20 - 5.90 M/uL    Hemoglobin 10.8 (L) 13.5 - 17.5 g/dL    Hematocrit 32.7 (L) 40.5 - 52.5 %    MCV 90.4 80.0 - 100.0 fL    MCH 29.7 26.0 - 34.0 pg    MCHC 32.9 31.0 - 36.0 g/dL    RDW 13.5 12.4 - 15.4 %    Platelets 240 935 - 061 K/uL    MPV 7.5 5.0 - 10.5 fL    Neutrophils % 89.5 %    Lymphocytes % 3.9 %    Monocytes % 3.5 %    Eosinophils % 2.6 %    Basophils % 0.5 %    Neutrophils Absolute 11.9 (H) 1.7 - 7.7 K/uL    Lymphocytes Absolute 0.5 (L) 1.0 - 5.1 K/uL    Monocytes Absolute 0.5 0.0 - 1.3 K/uL    Eosinophils Absolute 0.3 0.0 - 0.6 K/uL    Basophils Absolute 0.1 0.0 - 0.2 K/uL   CMP w/ Reflex to MG   Result Value Ref Range    Sodium 133 (L) 136 - 145 mmol/L    Potassium reflex Magnesium 5.6 (H) 3.5 - 5.1 mmol/L    Chloride 98 (L) 99 - 110 mmol/L    CO2 25 21 - 32 mmol/L    Anion Gap 10 3 - 16    Glucose 133 (H) 70 - 99 mg/dL    BUN 15 7 - 20 mg/dL    Creatinine 0.8 0.8 - 1.3 mg/dL    Est, Glom Filt Rate >60 >60    Calcium 8.9 8.3 - 10.6 mg/dL    Total Protein 6.9 6.4 - 8.2 g/dL    Albumin 3.0 (L) 3.4 - 5.0 g/dL    Albumin/Globulin Ratio 0.8 (L) 1.1 - 2.2

## 2023-03-27 VITALS
HEART RATE: 106 BPM | BODY MASS INDEX: 27.17 KG/M2 | SYSTOLIC BLOOD PRESSURE: 117 MMHG | OXYGEN SATURATION: 96 % | RESPIRATION RATE: 19 BRPM | DIASTOLIC BLOOD PRESSURE: 77 MMHG | TEMPERATURE: 98.2 F | WEIGHT: 183.42 LBS | HEIGHT: 69 IN

## 2023-03-27 LAB
BASOPHILS # BLD: 0.2 K/UL (ref 0–0.2)
BASOPHILS NFR BLD: 1.2 %
DEPRECATED RDW RBC AUTO: 13.2 % (ref 12.4–15.4)
EKG ATRIAL RATE: 109 BPM
EKG DIAGNOSIS: NORMAL
EKG P AXIS: 72 DEGREES
EKG P-R INTERVAL: 144 MS
EKG Q-T INTERVAL: 340 MS
EKG QRS DURATION: 78 MS
EKG QTC CALCULATION (BAZETT): 457 MS
EKG R AXIS: 52 DEGREES
EKG T AXIS: 92 DEGREES
EKG VENTRICULAR RATE: 109 BPM
EOSINOPHIL # BLD: 0 K/UL (ref 0–0.6)
EOSINOPHIL NFR BLD: 0 %
HCT VFR BLD AUTO: 28.5 % (ref 40.5–52.5)
HGB BLD-MCNC: 9.5 G/DL (ref 13.5–17.5)
LYMPHOCYTES # BLD: 0.5 K/UL (ref 1–5.1)
LYMPHOCYTES NFR BLD: 4 %
MCH RBC QN AUTO: 30.1 PG (ref 26–34)
MCHC RBC AUTO-ENTMCNC: 33.2 G/DL (ref 31–36)
MCV RBC AUTO: 90.6 FL (ref 80–100)
MONOCYTES # BLD: 1.2 K/UL (ref 0–1.3)
MONOCYTES NFR BLD: 8.9 %
NEUTROPHILS # BLD: 11.2 K/UL (ref 1.7–7.7)
NEUTROPHILS NFR BLD: 85.9 %
PLATELET # BLD AUTO: 382 K/UL (ref 135–450)
PMV BLD AUTO: 7.3 FL (ref 5–10.5)
RBC # BLD AUTO: 3.15 M/UL (ref 4.2–5.9)
WBC # BLD AUTO: 13.1 K/UL (ref 4–11)

## 2023-03-27 PROCEDURE — 36415 COLL VENOUS BLD VENIPUNCTURE: CPT

## 2023-03-27 PROCEDURE — 6360000002 HC RX W HCPCS: Performed by: INTERNAL MEDICINE

## 2023-03-27 PROCEDURE — 2700000000 HC OXYGEN THERAPY PER DAY

## 2023-03-27 PROCEDURE — 94761 N-INVAS EAR/PLS OXIMETRY MLT: CPT

## 2023-03-27 PROCEDURE — 6370000000 HC RX 637 (ALT 250 FOR IP): Performed by: INTERNAL MEDICINE

## 2023-03-27 PROCEDURE — 2580000003 HC RX 258: Performed by: INTERNAL MEDICINE

## 2023-03-27 PROCEDURE — 85025 COMPLETE CBC W/AUTO DIFF WBC: CPT

## 2023-03-27 PROCEDURE — 93010 ELECTROCARDIOGRAM REPORT: CPT | Performed by: INTERNAL MEDICINE

## 2023-03-27 RX ADMIN — MORPHINE SULFATE 2 MG: 2 INJECTION, SOLUTION INTRAMUSCULAR; INTRAVENOUS at 20:12

## 2023-03-27 RX ADMIN — SODIUM CHLORIDE, PRESERVATIVE FREE 5 ML: 5 INJECTION INTRAVENOUS at 00:10

## 2023-03-27 RX ADMIN — OXYCODONE 10 MG: 5 TABLET ORAL at 11:04

## 2023-03-27 RX ADMIN — OXYCODONE 10 MG: 5 TABLET ORAL at 15:35

## 2023-03-27 RX ADMIN — SODIUM CHLORIDE, PRESERVATIVE FREE 10 ML: 5 INJECTION INTRAVENOUS at 09:40

## 2023-03-27 RX ADMIN — OXYCODONE 10 MG: 5 TABLET ORAL at 02:15

## 2023-03-27 ASSESSMENT — PAIN DESCRIPTION - LOCATION
LOCATION: ABDOMEN
LOCATION: ABDOMEN;SHOULDER

## 2023-03-27 ASSESSMENT — PAIN SCALES - GENERAL
PAINLEVEL_OUTOF10: 10
PAINLEVEL_OUTOF10: 4
PAINLEVEL_OUTOF10: 4
PAINLEVEL_OUTOF10: 0
PAINLEVEL_OUTOF10: 10

## 2023-03-27 ASSESSMENT — PAIN DESCRIPTION - ORIENTATION
ORIENTATION: MID;LEFT
ORIENTATION: MID
ORIENTATION: MID

## 2023-03-27 ASSESSMENT — PAIN DESCRIPTION - DESCRIPTORS
DESCRIPTORS: ACHING
DESCRIPTORS: DISCOMFORT
DESCRIPTORS: SHARP

## 2023-03-27 ASSESSMENT — PAIN - FUNCTIONAL ASSESSMENT: PAIN_FUNCTIONAL_ASSESSMENT: ACTIVITIES ARE NOT PREVENTED

## 2023-03-27 NOTE — PROGRESS NOTES
Discharge order entered for hospice. Dr Demetrio Dennis notified of discharge. Discharge summary will be completed by Dr. Demetrio Dennis.     Electronically signed by Juan Francisco Masterson MD on 3/27/2023 at 5:16 PM

## 2023-03-27 NOTE — DISCHARGE INSTR - DIET

## 2023-03-27 NOTE — CONSULTS
2000- Called Vascular Surgery  Per David POTTER  RE new LLL PE while on eliquis.   Also with metastatic lung cancer and bleeding from metastases at the adrenal gland  2006- Dr. Shannon Obrien returned page
Primary lung cancer with extensive metastases, new PE as well as hemorrhage from left adrenal metastases  2010- [place] ), spoke to BODØ with intake. [facility] to see patient inpatient tomorrow.   Cara POTTER made aware
activity: Not on file   Other Topics Concern    Not on file   Social History Narrative    Not on file     Social Determinants of Health     Financial Resource Strain: Not on file   Food Insecurity: Not on file   Transportation Needs: Not on file   Physical Activity: Not on file   Stress: Not on file   Social Connections: Not on file   Intimate Partner Violence: Not on file   Housing Stability: Not on file          Family History:  Family History   Problem Relation Age of Onset    Alzheimer's Disease Mother        REVIEW OF SYSTEMS:      Constitutional: Denies fever, sweats, weight loss  Eyes: No visual changes or diplopia. No scleral icterus. Ent: No headaches, hearing loss or vertigo. No mouth sores or sore throat. Cardiovascular: No chest pain, dyspnea on exertion, palpitations or loss of consciousness. Respiratory: No cough or wheezing, no sputum production. No hemoptysis. Gastrointestinal: No abdominal pain, appetite loss, blood in stools. No change in bowel habits. Genitourinary: No dysuria, trouble voiding, or hematuria. Musculoskeletal: No generalized weakness. No joint complaints. Integumentary: No rash or pruritus. Neurological: No headache, diplopia. No change in gait, balance, or coordination. No paresthesias. Endocrine: No temperature intolerance. No excessive thirst, fluid intake, urination. Hematologic/lymphatic: No abnormal bruising or ecchymosis, blood clots or swollen lymph nodes. Allergic/immunologic: No nasal congestion or hives.         PHYSICAL EXAM:      Vitals:  Patient Vitals for the past 24 hrs:   BP Temp Temp src Pulse Resp SpO2 Height Weight   03/27/23 0215 -- -- -- -- 22 -- -- --   03/26/23 2352 -- -- -- -- 20 -- -- --   03/26/23 2145 137/86 98.9 °F (37.2 °C) Oral (!) 122 20 96 % 5' 9\" (1.753 m) 183 lb 6.8 oz (83.2 kg)   03/26/23 1943 (!) 170/94 -- -- (!) 115 26 -- -- --   03/26/23 1940 -- -- -- (!) 114 (!) 32 -- -- --   03/26/23 1823 (!) 159/103 -- -- (!) 108 23 (!) 89 %

## 2023-03-27 NOTE — PROGRESS NOTES
[address] and spoke to [facility], report given. Denied questions.

## 2023-03-27 NOTE — PROGRESS NOTES
Assessment completed and documented. VSS. A/ox4,. Forgetful at times, drowsy. Very productive cough, thick mucous. Suction set up at bedside. Offered to help with oral care, left supplies at bedside for patient. X1 assist. Patient sits on side of bed to urinate in urinal. Lungs diminished. C/o 4/10 abd pain, but will repeat, \"it's not bad. \" Left leg edema, pedal pulses moderate. Denies pain. Bed locked and in lowest position. Bedside table and call light within reach. Denies further needs at this time. @9604 Updated patient's sister, Buelah Lesches this morning. Patient's brother at bedside. Decreased patients o2 to 1L o2 sating at 94%    ACP paperwork printed for patient and family at bedside. Report given to Hospice. White board in patient's room updated.

## 2023-03-27 NOTE — PROGRESS NOTES
4 Eyes Skin Assessment     The patient is being assess for  Admission    I agree that 2 RN's have performed a thorough Head to Toe Skin Assessment on the patient. ALL assessment sites listed below have been assessed. Areas assessed by both nurses: Umu Rivera RN; Keila Ayers RN  [x]   Head, Face, and Ears   [x]   Shoulders, Back, and Chest  [x]   Arms, Elbows, and Hands   [x]   Coccyx, Sacrum, and Ischum  [x]   Legs, Feet, and Heels        Does the Patient have Skin Breakdown?   No         Kurt Prevention initiated:  No   Wound Care Orders initiated:  No      C nurse consulted for Pressure Injury (Stage 3,4, Unstageable, DTI, NWPT, and Complex wounds):  No      Nurse 1 eSignature: Electronically signed by Tomasz Le RN on 3/27/23 at 2:01 AM EDT    **SHARE this note so that the co-signing nurse is able to place an eSignature**    Nurse 2 eSignature: Electronically signed by Leida Hughes RN on 3/27/23 at 2:04 AM EDT

## 2023-03-27 NOTE — H&P
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 He wants to be made comfortable and pain-free and wants to pursue hospice. Hospice was consulted  -CODE STATUS will be DNR CC  -Eliquis will be on hold as the risks of worsening adrenal hemorrhage outweigh his need for anticoagulation for acute PE, vascular surgery aware  -Pain control  -We will plan for home hospice set up    DVT Prophylaxis: none  Diet: No diet orders on file  Code Status: Full code    PT/OT Eval Status: Ambulatory    Dispo -IV history       Ashu Gonzalez MD    Thank you No primary care provider on file. for the opportunity to be involved in this patient's care. If you have any questions or concerns please feel free to contact me at [phone].

## 2023-03-27 NOTE — DISCHARGE SUMMARY
Hospital Medicine Discharge Summary    Patient ID: Cristy Roberts      Patient's PCP: No primary care provider on file. 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 Hospital Course:     69 y.o. male who presented to [facility] with abdominal pain  Patient with recently diagnosed metastatic lung CA (non-small cell-adenocarcinoma), tobacco abuse, COPD, adrenal hemorrhage with mets, presented to the ED today with complaints of abdominal pain. Patient reports sudden onset abdominal pain today afternoon, site periumbilical and right sided quadrants, intensity 8/10, continuous but waxing and waning, quality sharp, radiates bilaterally around the sides and wraps around into his back, no associated nausea vomiting or diarrhea. He is currently on Eliquis for history of leg DVT. Patient was recently admitted to [facility] from 3/7 to 3/10 and was found to have widespread metastatic malignant neoplastic disease with metastatic lesions in the liver and adrenals, bilateral hilar lymphadenopathy and lung mass. He underwent bronchoscopy EBUS with biopsy, biopsy results came back as lung adenocarcinoma. He is to set up care with [facility] oncology for treatment plan. However they are planning on hospice and wife reports she was going to contact hospice tomorrow.   Patient

## 2023-03-27 NOTE — PROGRESS NOTES
Patient arrived from [facility] via stretcher. VSS, afebrile. Currently on 3L N/C. Alert and oriented x3. Denies nausea. Prn pain med given with some relief. No BM this shift. Voiding per urinal.  Up x1. Shift 4 eyes complete. Bed in lowest position, bed alarm in place, call light and belongings within reach, non skid socks in place.

## 2023-03-27 NOTE — CARE COORDINATION
Case Management Discharge Summary- Hospice Discharge    Destination:   [facility] Agency name and contact: Alex Antony arrangements are completed by the Hospice nurse. na    [facility] DNR signed and placed in discharge packet AND patient's hard chart. (This is required for DNR patients.)yes    Signed ECOC/AVS faxed to above agency/facility. Pending MD completion    Transportation arrangements per Hospice RN. Transport [facility] and  [facility] 8[facility]    Transport form completed and signed by:  Tobi Haque form placed with discharge packet:     Notified Family:at bedside  Contact name:Asha    Patient's RN notified of plan:Natalee, call rept to [phone]    Note:    Discharging nurse to complete nursing portion of ECOC, reconcile AVS, place final copy with patient's discharge packet. RN to ensure signed prescriptions are sent home with patient or the facility as per nursing protocol.     Mackenzie Charles RN

## 2023-03-27 NOTE — CARE COORDINATION
Spoke with patients brother and sister bedside. Discussed hospice order. Agreeable to plan, patient from [place]. Aware HOC does not go to [place]. [facility]. Agreeable to referral to [facility]. Done, would like meeting today,  Anjelica Brown.  Sarah Kuhn RN    Spoke with Aldair Romero with [facility], meeting with family at 2-230pm. Sarah Kuhn RN

## 2023-03-27 NOTE — PROGRESS NOTES
Emotional/Spiritual support consult. Family members having meeting, 'not a good time' as stated by family, chaplains will remain available for family as needed/requested. Thank you for consulting Spiritual Care    If you need a  for emotional, spiritual or comfort care,   please dial \"0\" and ask for the  on-call to be paged.     For help with [address] or Living Will forms,you may also call us directly:  [phone]) [facility]  [phone] ([phone]) [facility]  [phone]) Outpatient    - Amber Knock

## 2023-03-28 NOTE — PROGRESS NOTES
Patient discharged off unit around 2030 via [address] transport on 2L oxgyen. PRN morphine given prior to transfer. PIV R arm left in place per receiving facility. Family left with pt. No belongings at bedside.

## 2023-05-08 NOTE — PLAN OF CARE
Problem: ABCDS Injury Assessment  Goal: Absence of physical injury  3/27/2023 0934 by Meghna Nazario RN  Outcome: Progressing  3/27/2023 0812 by Dagoberto Ramos RN  Outcome: Progressing  Flowsheets (Taken 3/27/2023 9434)  Absence of Physical Injury: Implement safety measures based on patient assessment     Problem: Safety - Adult  Goal: Free from fall injury  3/27/2023 0934 by Meghna Nazario RN  Outcome: Progressing  Flowsheets (Taken 3/27/2023 5668)  Free From Fall Injury: Based on caregiver fall risk screen, instruct family/caregiver to ask for assistance with transferring infant if caregiver noted to have fall risk factors  3/27/2023 0812 by Dagoberto Ramos RN  Outcome: Progressing  Flowsheets (Taken 3/27/2023 1078)  Free From Fall Injury: Instruct family/caregiver on patient safety
Problem: ABCDS Injury Assessment  Goal: Absence of physical injury  3/27/2023 1741 by Lynda Rene RN  Outcome: Adequate for Discharge  3/27/2023 7033 by Lynda Rene RN  Outcome: Progressing  3/27/2023 0812 by Rody Fleming RN  Outcome: Progressing  Flowsheets (Taken 3/27/2023 9160)  Absence of Physical Injury: Implement safety measures based on patient assessment     Problem: Safety - Adult  Goal: Free from fall injury  3/27/2023 1741 by Lynda Rene RN  Outcome: Adequate for Discharge  3/27/2023 0934 by Lynda Rene RN  Outcome: Progressing  Flowsheets (Taken 3/27/2023 8259)  Free From Fall Injury: Based on caregiver fall risk screen, instruct family/caregiver to ask for assistance with transferring infant if caregiver noted to have fall risk factors  3/27/2023 0812 by Rody Fleming RN  Outcome: Progressing  Flowsheets (Taken 3/27/2023 5581)  Free From Fall Injury: Instruct family/caregiver on patient safety     Problem: Pain  Goal: Verbalizes/displays adequate comfort level or baseline comfort level  3/27/2023 1741 by Lynda Rene RN  Outcome: Adequate for Discharge     Problem: Respiratory - Adult  Goal: Achieves optimal ventilation and oxygenation  3/27/2023 1741 by Lynda Rene RN  Outcome: Adequate for Discharge  3/27/2023 9068 by Rody Fleming RN  Outcome: Progressing  Flowsheets (Taken 3/27/2023 5384)  Achieves optimal ventilation and oxygenation:   Assess for changes in respiratory status   Assess for changes in mentation and behavior   Oxygen supplementation based on oxygen saturation or arterial blood gases   Assess and instruct to report shortness of breath or any respiratory difficulty   Respiratory therapy support as indicated   Encourage broncho-pulmonary hygiene including cough, deep breathe, incentive spirometry
Problem: ABCDS Injury Assessment  Goal: Absence of physical injury  Outcome: Progressing  Flowsheets (Taken 3/27/2023 0812)  Absence of Physical Injury: Implement safety measures based on patient assessment     Problem: Safety - Adult  Goal: Free from fall injury  Outcome: Progressing  Flowsheets (Taken 3/27/2023 0812)  Free From Fall Injury: Instruct family/caregiver on patient safety     Problem: Pain  Goal: Verbalizes/displays adequate comfort level or baseline comfort level  Outcome: Progressing  Flowsheets (Taken 3/27/2023 0812)  Verbalizes/displays adequate comfort level or baseline comfort level:   Encourage patient to monitor pain and request assistance   Assess pain using appropriate pain scale   Administer analgesics based on type and severity of pain and evaluate response   Implement non-pharmacological measures as appropriate and evaluate response   Consider cultural and social influences on pain and pain management   Notify Licensed Independent Practitioner if interventions unsuccessful or patient reports new pain     Problem: Respiratory - Adult  Goal: Achieves optimal ventilation and oxygenation  Outcome: Progressing  Flowsheets (Taken 3/27/2023 0812)  Achieves optimal ventilation and oxygenation:   Assess for changes in respiratory status   Assess for changes in mentation and behavior   Oxygen supplementation based on oxygen saturation or arterial blood gases   Assess and instruct to report shortness of breath or any respiratory difficulty   Respiratory therapy support as indicated   Encourage broncho-pulmonary hygiene including cough, deep breathe, incentive spirometry
Alert-The patient is alert, awake and responds to voice. The patient is oriented to time, place, and person. The triage nurse is able to obtain subjective information.

## 2023-12-15 NOTE — CONSULTS
[facility]    Consult Note      Requesting Physician:  Mayito Hunter Anusionwu    CHIEF COMPLAINT:  lung mass with likely metastatic disease      HISTORY OF PRESENT ILLNESS:    Lawrence Porter is a 69year old male with PMHX of tobacco use disorder (45 pack year) who presented to [facility] ED for complaint of RUQ abdominal pain since 03/06. Has been having increasing SOB and cough for a few months. 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 Multiple foci of hepatic metastatic disease and large infrarenal AAA measuring 5.1cm. CT chest showed multiple pulmonary nodules with dominant mass in the RLL consistent with metastatic disease and left scapular erosion. Oncology consulted for RML mass and metastatic evidence of CT. Unable to see patient prior to office hours this morning as he is in OR. Mr. Nisha Watson  is a 69 y.o. male we are seeing in consultation for     ICD-10-CM    1. Widespread metastatic malignant neoplastic disease ([facility] [address]  C80.0       2. Adrenal hemorrhage (HCC)  E27.49       3. Mass of right lung  R91.8       4. Infrarenal abdominal aortic aneurysm (AAA) without rupture  I71.43       5. Electrolyte disturbance  E87.8              Past Medical History:  Past Medical History:   Diagnosis Date    Hypertension        Past Surgical History:  History reviewed. No pertinent surgical history.     Current Medications:  Current Facility-Administered Medications   Medication Dose Route Frequency Provider Last Rate Last Admin    sodium chloride flush 0.9 % injection 5-40 mL  5-40 mL IntraVENous 2 times per day Lynann Hodgkin, APRN - CNP   10 mL at 03/09/23 0826    sodium chloride flush 0.9 % injection 5-40 mL  5-40 mL IntraVENous PRN Lynann Hodgkin, APRN - CNP        0.9 % sodium chloride infusion   IntraVENous PRN VIKA Ramos CNP        ondansetron (ZOFRAN-ODT) disintegrating tablet 4 mg  4 mg Oral Q8H PRN VIKA Ramos CNP        Or    ondansetron (ZOFRAN) injection 4 mg  4 mg IntraVENous Q6H PRN VIKA Ramos CNP        polyethylene glycol (GLYCOLAX) packet 17 g  17 g Oral Daily PRN VIKA Ramos CNP        acetaminophen (TYLENOL) tablet 650 mg  650 mg Oral Q6H PRN VIKA Ramos CNP        Or    acetaminophen (TYLENOL) suppository 650 mg  650 mg Rectal Q6H PRN VIKA Ramos CNP        pantoprazole (PROTONIX) tablet 40 mg  40 mg Oral QAM AC VIKA Ramos - CNP   40 mg at 03/09/23 4067    HYDROmorphone (DILAUDID) injection 0.25 mg  0.25 mg IntraVENous Q3H PRN VIKA Ramos - CNP   0.25 mg at 03/09/23 6999    Or    HYDROmorphone (DILAUDID) injection 0.5 mg  0.5 mg IntraVENous Q3H PRN VIKA Ramos - CNP   0.5 mg at 03/09/23 0826    labetalol (NORMODYNE;TRANDATE) injection 10 mg  10 mg IntraVENous Q4H PRN VIKA Ramos - CNP   10 mg at 03/08/23 0241    melatonin disintegrating tablet 5 mg  5 mg Oral Nightly Hollis Razo MD   5 mg at 03/08/23 2001       Allergies:   Allergies   Allergen Reactions    Codeine Rash       Social History:  Social History     Socioeconomic History    Marital status: Single     Spouse name: Not on file    Number of children: Not on file    Years of education: Not on file    Highest education level: Not on file   Occupational History    Not on file   Tobacco Use    Smoking status: Former     Types: Cigarettes    Smokeless tobacco: Never   Substance and Sexual Activity    Alcohol use: Never    Drug use: Never    Sexual activity: Not on file   Other Topics Concern    Not on file   Social History Narrative    Not on file     Social Determinants of Health     Financial Resource Strain: Not on file   Food Insecurity: Not on file   Transportation Needs: Not on file   Physical Activity: Not on file   Stress: Not on file   Social Connections: Not on file   Intimate Partner Violence: Not on file   Housing Stability: Not on file          Family History:  History reviewed. No pertinent family history. REVIEW OF SYSTEMS:      Patient in OR. PHYSICAL EXAM:      Vitals:  Patient Vitals for the past 24 hrs:   BP Temp Temp src Pulse Resp SpO2 Weight   03/09/23 0824 (!) 148/85 97.8 °F (36.6 °C) Oral 88 16 96 % --   03/09/23 0627 -- -- -- -- -- -- 186 lb 3.2 oz (84.5 kg)   03/09/23 0337 -- -- -- -- 16 -- --   03/09/23 0310 121/77 98 °F (36.7 °C) Oral 74 14 94 % --   03/08/23 2328 126/78 98.2 °F (36.8 °C) Oral 97 16 95 % --   03/08/23 2000 (!) 148/86 98.6 °F (37 °C) Oral 89 16 96 % --   03/08/23 1445 123/77 98.2 °F (36.8 °C) Oral 96 20 95 % --   03/08/23 1136 (!) 145/82 98.1 °F (36.7 °C) Oral 93 20 95 % --       Date 03/09/23 0000 - 03/09/23 2359   Shift 6135-8811 3210-0120 3419-0133 24 Hour Total   INTAKE   Shift Total(mL/kg)       OUTPUT   Urine(mL/kg/hr) 550(0.8)   550   Shift Total(mL/kg) 550(6.5)   550(6.5)   Weight (kg) 84.5 84.5 84.5 84.5           DATA:    PT/INR:    Recent Labs     03/08/23  0450 03/08/23 0055 03/07/23  2316   PROT 6.2*  --  7.0   INR  --  1.24*  --      PTT:    Recent Labs     03/08/23 0055   APTT 28.7       CMP:    Recent Labs     03/08/23 0450   *   K 4.2   CL 94*   CO2 23   BUN 24*   PROT 6.2*     Mg:  No results for input(s): MG in the last 720 hours. Lab Results   Component Value Date    CALCIUM 8.7 03/08/2023       CBC:    Recent Labs     03/09/23  0834 03/08/23  0450 03/07/23  2316   WBC 10.2 14.2* 15.9*   NEUTROABS  --  11.7* 14.5*   LYMPHOPCT  --  5.7 1.0   RBC 3.87* 4.05* 4.50   HGB 12.0* 12.4* 13.6   HCT 35.4* 37.0* 40.8   MCV 91.4 91.2 90.7   MCH 31.0 30.6 30.3   MCHC 34.0 33.6 33.4   RDW 13.3 13.6 13.3    192 222        LDH:No results for input(s): LDH in the last 720 hours.       Radiology Review:  XR CHEST PORTABLE  Narrative: EXAMINATION:  ONE XRAY VIEW OF THE CHEST    3/7/2023 11:47 pm    COMPARISON:  None. HISTORY:  ORDERING SYSTEM PROVIDED HISTORY: tachycardia  TECHNOLOGIST PROVIDED HISTORY:  Reason for exam:->tachycardia  Reason for Exam: Abdominal Pain (RUQ pain. Started Monday. Last BM this  weekend. Emesis x1)    FINDINGS:  The cardiomediastinal silhouette is not enlarged. No pleural effusion or  pneumothorax. Mild interstitial prominence most notable in the right lower  lobe. Circumscribed nodule in the left upper lobe is not definitively  calcified and is indeterminate without a comparison study. Impression: Mild nonspecific interstitial prominence in the right lung base could be  atelectasis or mild pneumonia. Indeterminate pulmonary nodule in the left upper lobe. Recommend correlation with CT chest.  CT Chest W/O Contrast  Narrative: EXAMINATION:  CT OF THE CHEST WITHOUT CONTRAST 3/8/2023 12:28 am    TECHNIQUE:  CT of the chest was performed without the administration of intravenous  contrast. Multiplanar reformatted images are provided for review. Automated  exposure control, iterative reconstruction, and/or weight based adjustment of  the mA/kV was utilized to reduce the radiation dose to as low as reasonably  achievable. COMPARISON:  Same-day CT abdomen and chest x-ray    HISTORY:  ORDERING SYSTEM PROVIDED HISTORY: abnormal CXR  TECHNOLOGIST PROVIDED HISTORY:  Reason for exam:->abnormal CXR  Decision Support Exception - unselect if not a suspected or confirmed  emergency medical condition->Emergency Medical Condition (MA)  Reason for Exam: abnormal chest xray    FINDINGS:  Mediastinum: The heart is not enlarged. No pericardial effusion. Normal  caliber thoracic aorta measuring up to 3.5 cm at the ascending thoracic  aorta. Right hilar lymphadenopathy measuring up to 1.7 cm in short axis. Mildly enlarged pretracheal lymph node measuring 1.3 cm in short axis.   Subcarinal lymph node measures up to 1.3 cm.    Lungs/pleura: Multiple highly concerning pulmonary nodules including a  dominant spiculated mass in the right lower lobe measuring 3.0 x 2.7 by 2.5  cm. This abuts and may partially cross the major fissure. Pulmonary nodule  seen on chest x-ray in the left upper lobe measures 1.3 cm and is also  concerning for metastatic disease. Bronchiectasis with mild atelectasis in  the right lower lobe. Right perihilar pulmonary nodule measures 2.7 x 2.6  cm. No pleural effusion or pneumothorax. Mild emphysema. Upper Abdomen: See same day CT abdomen. Right retroperitoneal hematoma with  hemorrhagic masses favoring metastatic disease at the level of the right  adrenal glands. Hepatic metastatic disease is better seen on same-day CT  abdomen. Soft Tissues/Bones: No acute findings within the subcutaneous soft tissues. Spondylosis of the visualized spine. Osseous erosions of the left lateral  scapula compatible with osseous metastatic disease. Other infectious process  felt less likely given the additional findings. Impression: 1. Few bilateral pulmonary nodules including a dominant mass in the right  lower lobe compatible with malignancy with metastatic disease. 2. Mediastinal and right hilar lymphadenopathy. 3. Erosions of the left scapula compatible with osseous metastatic disease. Osteomyelitis could appear similar, but felt less likely given the additional  findings. 4. See same day CT abdomen regarding the upper abdominal findings. 5. Normal caliber thoracic aorta. 6. Mild emphysema. CT ABDOMEN PELVIS W IV CONTRAST Additional Contrast? None  Narrative: EXAMINATION:  CT OF THE ABDOMEN AND PELVIS WITH CONTRAST 3/7/2023 11:45 pm    TECHNIQUE:  CT of the abdomen and pelvis was performed with the administration of  intravenous contrast. Multiplanar reformatted images are provided for review.   Automated exposure control, iterative reconstruction, and/or weight based  adjustment of the mA/kV was utilized to reduce the radiation dose to as low  as reasonably achievable. COMPARISON:  None. HISTORY:  ORDERING SYSTEM PROVIDED HISTORY: RUQ pain, positive moctezuma's sign  TECHNOLOGIST PROVIDED HISTORY:  Additional Contrast?->None  Reason for exam:->RUQ pain, positive moctezuma's sign  Decision Support Exception - unselect if not a suspected or confirmed  emergency medical condition->Emergency Medical Condition (MA)  Reason for Exam: right upper abdominal pain  Relevant Medical/Surgical History: positive moctezuma's sign    FINDINGS:  Lower Chest: Bronchiectasis in the right lung base with mild right basilar  infiltrate or atelectasis. Background of mild emphysema. Organs:    Liver: Multiple liver masses concerning for metastatic disease. The largest  measures 4.8 x 4.7 cm and 50 Hounsfield units. Gallbladder: Unremarkable gallbladder. No biliary ductal dilatation    Spleen: Unremarkable spleen. Pancreas: No peripancreatic inflammatory changes. Adrenal Glands: Ill-defined right adrenal gland. Hyperdense masses in the  expected location of the right adrenal gland measuring 60 Hounsfield units  and up to 5.4 x 3.4 by 6.1 cm and 4.1 x 3.3 cm. Favor hemorrhagic masses  such as metastatic disease. Spontaneous adrenal hemorrhage felt less likely. Unremarkable left adrenal gland. Kidneys: No hydronephrosis. Multiple small renal cysts bilaterally. Retroperitoneal hematoma likely extending from the right adrenal masses  adjacent to the right kidney. Nonobstructing bilateral nephrolithiasis  measuring up to 5 mm on the left. GI/Bowel:    Stomach: The stomach is nondistended. Small bowel: Inflammatory changes near the distal stomach and proximal  duodenum, likely related to the above process. Gastric bleeding is felt less  likely. No evidence of small-bowel obstruction. Colon: No significant pericolonic inflammatory changes. Appendix: Normal appearance of the appendix.     Pelvis: No free fluid in the pelvis. Prostate calcifications. Incomplete  distension of the urinary bladder. Peritoneum/Retroperitoneum: Large infrarenal abdominal aortic aneurysm  measuring up to 5.1 by 4.9 cm. There is an area of low attenuation, likely  intramural hematoma or thrombus surrounding the the aorta. The inferior  mesenteric artery projects through this area of low attenuation. Findings  are of undetermined stability without a comparison study. Bones/Soft Tissues: No acute findings within the subcutaneous soft tissues. Spondylosis of the visualized spine. Impression: 1. Hyperdense masses in the expected location of the right adrenal gland with  adjacent retroperitoneal hematoma. Findings are most compatible with  hemorrhagic adrenal metastatic disease. 2. Multiple foci of hepatic metastatic disease. 3. Large infrarenal abdominal aortic aneurysm measuring up to 5.1 cm. There  is low attenuation along the wall of the aorta surrounding the lumen, most  compatible with intramural hematoma or thrombus. This is of undetermined  stability without a comparison exam.  Recommend vascular consultation. Findings discussed directly with Sada Mena at approximately 12:15 a.m.  on 03/08/2023. Problem List  Patient Active Problem List   Diagnosis    Infrarenal abdominal aortic aneurysm (AAA) without rupture    Mass of right lung    Right adrenal mass (HCC)    Nontraumatic retroperitoneal hematoma    Hyponatremia    Mediastinal adenopathy    Current smoker    Leukocytosis    Centrilobular emphysema (HCC)    Widespread metastatic malignant neoplastic disease ([address]    Adrenal hemorrhage (HCC)       IMPRESSION/RECOMMENDATIONS:    Lung Mass  - unclear primary but likely lung cancer   - widespread metastatic disease of liver, bone, lymph nodes, adrenal gland noted on imaging.   - CT abd/pelvis shows multiple liver masses concerning for metastatic disease with largest measuring 4.8cmx4.7cm.  Hyperdense masses in the right adrenal gland measuring 5.4x 3.4x 6.1cm and 4.1x3.3cm. Associated hemorrhage noted. - CT chest shows paratracheal adenopathy 1.3cm, mediastinal adenopathy, right hilar LAD up to 1.7cm. Left upper lobe nodule 1.3cm and right lower lobe mass 3.0x 2.7x 2.5cm with posterior segment airway into. Additional right perihilar pulmonary nodule 2.7x2.6cm. Osseous mets of left lateral scapula. - pulmonology planning EBUS/Bronchoscopy today  - will need MRI brain inpatient to evaluate for metastatic disease- ordered   - PET scan outpatient and outpatient follow up will need to be established on D/C  - CARIS testing will need to be ordered outpatient on biopsy results as well     Infrarenal AAA  - about 5.1cm with right adrenal mass and corresponding hemorrhage/hematoma concerning for bleed  - vascular surgery following; no indication for urgent repair, recommended repair when >5.5cm     Thank you for asking me to see the patient.      ABBEY Mullins  Please Contact Through [employer] Yes-Patient/Caregiver accepts free interpretation services...

## (undated) DEVICE — CANNULA,OXY,ADULT,SUPERSOFT,W/7'TUB,SC: Brand: MEDLINE INDUSTRIES, INC.

## (undated) DEVICE — YANKAUER,BULB TIP,W/O VENT,RIGID,STERILE: Brand: MEDLINE

## (undated) DEVICE — TUBING, SUCTION, 3/16" X 12', STRAIGHT: Brand: MEDLINE

## (undated) DEVICE — CONMED SCOPE SAVER BITE BLOCK, 20X27 MM: Brand: SCOPE SAVER

## (undated) DEVICE — BOWL MED M 16OZ PLAS CAP GRAD

## (undated) DEVICE — TRAP,MUCUS SPECIMEN, 80CC: Brand: MEDLINE

## (undated) DEVICE — TUBING, SUCTION, 3/16" X 6', STRAIGHT: Brand: MEDLINE

## (undated) DEVICE — ELECTRODE,RADIOTRANSLUCENT,FOAM,3PK: Brand: MEDLINE

## (undated) DEVICE — LINER,SOFT,SUCTION CANISTER,1500CC: Brand: MEDLINE

## (undated) DEVICE — SINGLE USE BIOPSY VALVE MAJ-210: Brand: SINGLE USE BIOPSY VALVE (STERILE)

## (undated) DEVICE — SYRINGE MED 50ML LUERSLIP TIP

## (undated) DEVICE — SINGLE USE SUCTION VALVE MAJ-209: Brand: SINGLE USE SUCTION VALVE (STERILE)

## (undated) DEVICE — SYRINGE MED 10ML SLIP TIP BLNT FILL AND LUERLOCK DISP

## (undated) DEVICE — NEEDLE ASPIR 21GA L700MM US GUID TREAT DST END FOR EFFICIENT